# Patient Record
Sex: FEMALE | HISPANIC OR LATINO | Employment: OTHER | URBAN - METROPOLITAN AREA
[De-identification: names, ages, dates, MRNs, and addresses within clinical notes are randomized per-mention and may not be internally consistent; named-entity substitution may affect disease eponyms.]

---

## 2017-01-04 ENCOUNTER — APPOINTMENT (OUTPATIENT)
Dept: RADIOLOGY | Facility: MEDICAL CENTER | Age: 77
End: 2017-01-04
Attending: EMERGENCY MEDICINE
Payer: MEDICARE

## 2017-01-04 ENCOUNTER — HOSPITAL ENCOUNTER (EMERGENCY)
Facility: MEDICAL CENTER | Age: 77
End: 2017-01-04
Attending: EMERGENCY MEDICINE
Payer: MEDICARE

## 2017-01-04 VITALS
SYSTOLIC BLOOD PRESSURE: 120 MMHG | BODY MASS INDEX: 30.02 KG/M2 | RESPIRATION RATE: 16 BRPM | HEIGHT: 62 IN | HEART RATE: 76 BPM | DIASTOLIC BLOOD PRESSURE: 62 MMHG | WEIGHT: 163.14 LBS | TEMPERATURE: 98.6 F | OXYGEN SATURATION: 92 %

## 2017-01-04 DIAGNOSIS — J40 BRONCHITIS: ICD-10-CM

## 2017-01-04 LAB
ALBUMIN SERPL BCP-MCNC: 3.2 G/DL (ref 3.2–4.9)
ALBUMIN/GLOB SERPL: 0.9 G/DL
ALP SERPL-CCNC: 93 U/L (ref 30–99)
ALT SERPL-CCNC: 18 U/L (ref 2–50)
ANION GAP SERPL CALC-SCNC: 9 MMOL/L (ref 0–11.9)
AST SERPL-CCNC: 21 U/L (ref 12–45)
BASOPHILS # BLD AUTO: 0.4 % (ref 0–1.8)
BASOPHILS # BLD: 0.05 K/UL (ref 0–0.12)
BILIRUB SERPL-MCNC: 0.6 MG/DL (ref 0.1–1.5)
BUN SERPL-MCNC: 11 MG/DL (ref 8–22)
CALCIUM SERPL-MCNC: 10.1 MG/DL (ref 8.4–10.2)
CHLORIDE SERPL-SCNC: 98 MMOL/L (ref 96–112)
CO2 SERPL-SCNC: 26 MMOL/L (ref 20–33)
CREAT SERPL-MCNC: 0.93 MG/DL (ref 0.5–1.4)
EOSINOPHIL # BLD AUTO: 0.25 K/UL (ref 0–0.51)
EOSINOPHIL NFR BLD: 2 % (ref 0–6.9)
ERYTHROCYTE [DISTWIDTH] IN BLOOD BY AUTOMATED COUNT: 42.5 FL (ref 35.9–50)
FLUAV+FLUBV AG SPEC QL IA: NORMAL
GFR SERPL CREATININE-BSD FRML MDRD: 59 ML/MIN/1.73 M 2
GLOBULIN SER CALC-MCNC: 3.6 G/DL (ref 1.9–3.5)
GLUCOSE SERPL-MCNC: 129 MG/DL (ref 65–99)
HCT VFR BLD AUTO: 39.6 % (ref 37–47)
HGB BLD-MCNC: 13.2 G/DL (ref 12–16)
IMM GRANULOCYTES # BLD AUTO: 0.04 K/UL (ref 0–0.11)
IMM GRANULOCYTES NFR BLD AUTO: 0.3 % (ref 0–0.9)
LYMPHOCYTES # BLD AUTO: 3.86 K/UL (ref 1–4.8)
LYMPHOCYTES NFR BLD: 31.3 % (ref 22–41)
MCH RBC QN AUTO: 29.8 PG (ref 27–33)
MCHC RBC AUTO-ENTMCNC: 33.3 G/DL (ref 33.6–35)
MCV RBC AUTO: 89.4 FL (ref 81.4–97.8)
MONOCYTES # BLD AUTO: 1.19 K/UL (ref 0–0.85)
MONOCYTES NFR BLD AUTO: 9.6 % (ref 0–13.4)
NEUTROPHILS # BLD AUTO: 6.95 K/UL (ref 2–7.15)
NEUTROPHILS NFR BLD: 56.4 % (ref 44–72)
NRBC # BLD AUTO: 0 K/UL
NRBC BLD AUTO-RTO: 0 /100 WBC
PLATELET # BLD AUTO: 305 K/UL (ref 164–446)
PMV BLD AUTO: 9.8 FL (ref 9–12.9)
POTASSIUM SERPL-SCNC: 4.2 MMOL/L (ref 3.6–5.5)
PROT SERPL-MCNC: 6.8 G/DL (ref 6–8.2)
RBC # BLD AUTO: 4.43 M/UL (ref 4.2–5.4)
SIGNIFICANT IND 70042: NORMAL
SITE SITE: NORMAL
SODIUM SERPL-SCNC: 133 MMOL/L (ref 135–145)
SOURCE SOURCE: NORMAL
WBC # BLD AUTO: 12.3 K/UL (ref 4.8–10.8)

## 2017-01-04 PROCEDURE — 71010 DX-CHEST-PORTABLE (1 VIEW): CPT

## 2017-01-04 PROCEDURE — 94640 AIRWAY INHALATION TREATMENT: CPT

## 2017-01-04 PROCEDURE — 99284 EMERGENCY DEPT VISIT MOD MDM: CPT

## 2017-01-04 PROCEDURE — A9270 NON-COVERED ITEM OR SERVICE: HCPCS | Performed by: EMERGENCY MEDICINE

## 2017-01-04 PROCEDURE — 80053 COMPREHEN METABOLIC PANEL: CPT

## 2017-01-04 PROCEDURE — 87503 INFLUENZA DNA AMP PROB ADDL: CPT

## 2017-01-04 PROCEDURE — 87040 BLOOD CULTURE FOR BACTERIA: CPT

## 2017-01-04 PROCEDURE — 85025 COMPLETE CBC W/AUTO DIFF WBC: CPT

## 2017-01-04 PROCEDURE — 87400 INFLUENZA A/B EACH AG IA: CPT

## 2017-01-04 PROCEDURE — 700102 HCHG RX REV CODE 250 W/ 637 OVERRIDE(OP): Performed by: EMERGENCY MEDICINE

## 2017-01-04 PROCEDURE — 87502 INFLUENZA DNA AMP PROBE: CPT

## 2017-01-04 PROCEDURE — 36415 COLL VENOUS BLD VENIPUNCTURE: CPT

## 2017-01-04 PROCEDURE — 700101 HCHG RX REV CODE 250: Performed by: EMERGENCY MEDICINE

## 2017-01-04 RX ORDER — HYDROCODONE BITARTRATE AND ACETAMINOPHEN 5; 325 MG/1; MG/1
1 TABLET ORAL ONCE
Status: COMPLETED | OUTPATIENT
Start: 2017-01-04 | End: 2017-01-04

## 2017-01-04 RX ORDER — HYDROCODONE BITARTRATE AND ACETAMINOPHEN 5; 325 MG/1; MG/1
1 TABLET ORAL EVERY 6 HOURS PRN
Qty: 15 TAB | Refills: 0 | Status: SHIPPED | OUTPATIENT
Start: 2017-01-04 | End: 2017-03-06

## 2017-01-04 RX ORDER — ALBUTEROL SULFATE 90 UG/1
2 AEROSOL, METERED RESPIRATORY (INHALATION)
Qty: 1 INHALER | Refills: 2 | Status: SHIPPED | OUTPATIENT
Start: 2017-01-04 | End: 2017-03-06

## 2017-01-04 RX ORDER — BENZONATATE 200 MG/1
200 CAPSULE ORAL 3 TIMES DAILY PRN
Qty: 30 CAP | Refills: 1 | Status: SHIPPED | OUTPATIENT
Start: 2017-01-04 | End: 2017-03-06

## 2017-01-04 RX ORDER — AZITHROMYCIN 250 MG/1
TABLET, FILM COATED ORAL
Qty: 6 TAB | Refills: 0 | Status: SHIPPED | OUTPATIENT
Start: 2017-01-04 | End: 2017-03-06

## 2017-01-04 RX ADMIN — ALBUTEROL SULFATE 2.5 MG: 2.5 SOLUTION RESPIRATORY (INHALATION) at 21:49

## 2017-01-04 RX ADMIN — IPRATROPIUM BROMIDE 0.5 MG: 0.5 SOLUTION RESPIRATORY (INHALATION) at 21:49

## 2017-01-04 RX ADMIN — HYDROCODONE BITARTRATE AND ACETAMINOPHEN 1 TABLET: 5; 325 TABLET ORAL at 21:47

## 2017-01-04 ASSESSMENT — PAIN SCALES - WONG BAKER: WONGBAKER_NUMERICALRESPONSE: HURTS JUST A LITTLE BIT

## 2017-01-04 NOTE — ED AVS SNAPSHOT
IncellDx Access Code: [email or web address]  Expires: 2/3/2017 10:52 PM    Your email address is not on file at [facility].  Email Addresses are required for you to sign up for IncellDx, please contact [phone] to verify your personal information and to provide your email address prior to attempting to register for IncellDx.    Pamela Monaco  [address] [place] [address]    IncellDx  A secure, online tool to manage your health information     [facility]’s IncellDx® is a secure, online tool that connects you to your personalized health information from the privacy of your home -- day or night - making it very easy for you to manage your healthcare. Once the activation process is completed, you can even access your medical information using the IncellDx nishi, which is available for free in the Apple Nishi store or Google Play store.     To learn more about IncellDx, visit [email or web address]    There are two levels of access available (as shown below):   My Chart Features  [facility] Primary Care Doctor [facility]  Specialists [facility]  Urgent  Care Non-[facility] Primary Care Doctor   Email your healthcare team securely and privately 24/7 X X X    Manage appointments: schedule your next appointment; view details of past/upcoming appointments X      Request prescription refills. X      View recent personal medical records, including lab and immunizations X X X X   View health record, including health history, allergies, medications X X X X   Read reports about your outpatient visits, procedures, consult and ER notes X X X X   See your discharge summary, which is a recap of your hospital and/or ER visit that includes your diagnosis, lab results, and care plan X X  X     How to register for SinDelantalt:  Once your e-mail address has been verified, follow the following steps to sign up for SinDelantalt.     1. Go to  [email or web address]  2. Click on the Sign Up Now box, which takes you to the New Member Sign Up page. You  will need to provide the following information:  a. Enter your PHARMAJET Access Code exactly as it appears at the top of this page. (You will not need to use this code after you’ve completed the sign-up process. If you do not sign up before the expiration date, you must request a new code.)   b. Enter your date of birth.   c. Enter your home email address.   d. Click Submit, and follow the next screen’s instructions.  3. Create a Inspire Healtht ID. This will be your PHARMAJET login ID and cannot be changed, so think of one that is secure and easy to remember.  4. Create a PHARMAJET password. You can change your password at any time.  5. Enter your Password Reset Question and Answer. This can be used at a later time if you forget your password.   6. Enter your e-mail address. This allows you to receive e-mail notifications when new information is available in PHARMAJET.  7. Click Sign Up. You can now view your health information.    For assistance activating your PHARMAJET account, call [phone]

## 2017-01-04 NOTE — ED AVS SNAPSHOT
1/4/2017          Pamela Monaco  [address] [facility] [address]    Dear Pamela:    [facility] wants to ensure your discharge home is safe and you or your loved ones have had all your questions answered regarding your care after you leave the hospital.    You may receive a telephone call within two days of your discharge.  This call is to make certain you understand your discharge instructions as well as ensure we provided you with the best care possible during your stay with us.     The call will only last approximately 3-5 minutes and will be done by a nurse.    Once again, we want to ensure your discharge home is safe and that you have a clear understanding of any next steps in your care.  If you have any questions or concerns, please do not hesitate to contact us, we are here for you.  Thank you for choosing [facility] for your healthcare needs.    Sincerely,    King Brody    [facility]

## 2017-01-04 NOTE — ED AVS SNAPSHOT
After Visit Summary                                                                                                                Pamela Monaco   MRN: [id number]    Department:  [facility], Emergency Dept   Date of Visit:  1/4/2017            [facility], Emergency Dept    [address]    [place] [address]    Phone:  [phone]      You were seen by     Paulo Carbajal M.D.      Your Diagnosis Was     Bronchitis     J40       These are the medications you received during your hospitalization from 01/04/2017 1811 to 01/04/2017 2252     Date/Time Order Dose Route Action    01/04/2017 2149 albuterol (PROVENTIL) 2.5mg/0.5ml nebulizer solution 2.5 mg 2.5 mg Inhalation Given    01/04/2017 2149 ipratropium (ATROVENT) 0.02 % nebulizer solution 0.5 mg 0.5 mg Nebulization Given    01/04/2017 2147 hydrocodone-acetaminophen (NORCO) 5-325 MG per tablet 1 Tab 1 Tab Oral Given      Follow-up Information     1. Follow up with Jj Ramirez M.D..    Specialty:  Family Medicine    Contact information    [facility]  [address]  [place] [id number] [phone]        Medication Information     Review all of your home medications and newly ordered medications with your primary doctor and/or pharmacist as soon as possible. Follow medication instructions as directed by your doctor and/or pharmacist.     Please keep your complete medication list with you and share with your physician. Update the information when medications are discontinued, doses are changed, or new medications (including over-the-counter products) are added; and carry medication information at all times in the event of emergency situations.               Medication List      START taking these medications        Instructions    albuterol 108 (90 BASE) MCG/ACT Aers inhalation aerosol    Inhale 2 Puffs by mouth every 2 hours as needed for Shortness of Breath (or cough).   Dose:  2 Puff       azithromycin  250 MG Tabs   Commonly known as:  ZITHROMAX    2 po now then 1 po q day for 4 days       benzonatate 200 MG capsule   Commonly known as:  TESSALON    Take 1 Cap by mouth 3 times a day as needed for Cough.   Dose:  200 mg       hydrocodone-acetaminophen 5-325 MG Tabs per tablet   Commonly known as:  NORCO    Take 1 Tab by mouth every 6 hours as needed (headache or cough).   Dose:  1 Tab         ASK your doctor about these medications        Instructions    enalapril 10 MG Tabs   Commonly known as:  VASOTEC    Take 10 mg by mouth every day.   Dose:  10 mg               Procedures and tests performed during your visit     Procedure/Test Number of Times Performed    BLOOD CULTURE 2    CBC WITH DIFFERENTIAL 1    COMP METABOLIC PANEL 1    DX-CHEST-PORTABLE (1 VIEW) 1    ESTIMATED GFR 1    INFLUENZA BY PCR, A/B/H1N1 1    INFLUENZA RAPID 1    IV SALINE LOCK 1        Discharge Instructions       Bronquitis  (Bronchitis)  Return to the ER if any worsening shortness of breath. Contact your doctor for follow-up  La bronquitis es gatito inflamación (el modo que tiene el organismo de reaccionar a gatito lesión o infección) de los bronquios Los bronquios son los conductos que se extienden desde la tráquea hasta los pulmones. Si la inflamación se agrava, puede causar la falta de aire.  CAUSAS  Las causas de la inflamación pueden ser:  · Un virus  · Gérmenes (bacteria).  · Polvo  · Alergenos  · La polución y muchos otros irritantes  Las células que revisten el árbol bronquial están cubiertas con pequeños pelos (cilias). Esta constantemente producen un movimiento desde los pulmones hacia la boca. De yony modo se mantienen los pulmones libres de polución. Cuando estas células se irritan y no pueden cumplir serrato función, comienza a formarse la mucosidad. [place] produce la característica tos de la bronquitis. La tos es el mecanismo por el cual se limpian los pulmones cuando las cilias no pueden cumplir serrato función. Sin alguno de estos mecanismos  protectores, el material se acumularía en los pulmones Entonces se desarrollaría gatito pulmonía.   El fumar es gatito de las causas más frecuentes de bronquitis y puede contribuir a la neumonía. Abandonar yony hábito es lo más importante que puede hacer para beneficiarse.  TRATAMIENTO  · El médico le prescribirá antibióticos si la causa es gatito bacteria, y medicamentos para abrir las vías aéreas y facilitar la respiración. También puede recomendar o prescribir un expectorante. El expectorante aflojará la mucosidad para que pueda eliminarla. Sólo tome medicamentos de venta jozef o prescriptos para calmar el dolor, las molestias, o bajar la fiebre según las indicaciones de serrato médico.  · Eliminar todo lo que causa el problema (por ejemplo el hábito de fumar) es fundamental para evitar que empeore.  · Un antitusígeno puede prescribirse para aliviar los síntomas de la tos.  · Podrán indicarle inhalantes para aliviar los síntomas actuales y ayudar a prevenir problemas futuros.  · Aquellos que sufren bronquitis crónica (recurrente) puede ser necesaria la administración de corticoides.  SOLICITE ATENCIÓN MÉDICA INMEDIATAMENTE SI:  · Jalyn el tratamiento observa que elimina esputo similar a pus (purulento).  · Tiene fiebre.  · Serrato bebé tiene más de 3 meses y serrato temperatura rectal es de 102° F (38.9° C) o más.  · Serrato bebé tiene 3 meses o menos y serrato temperatura rectal es de 100.4° F (38° C) o más.  · Se siente cada vez más enfermo.  · Tiene cada vez más dificultad para respirar, tiene ruidos al respirar o le falta el aire.  Es necesario buscar atención médica inmediata si es gatito persona de edad avanzada o sufre alguna otra enfermedad.  ASEGURESE DE QUE:   · Comprende estas instrucciones.  · Controlará serrato enfermedad.  · Solicitará ayuda inmediatamente si no mejora o si empeora.  Document Released: 12/18/2006 Document Revised: 03/11/2013  Sawerly® Patient Information ©2014 [employer].            Patient Information     Patient  Information    Following emergency treatment: all patient requiring follow-up care must return either to a private physician or a clinic if your condition worsens before you are able to obtain further medical attention, please return to the emergency room.     Billing Information    At [facility], we work to make the billing process streamlined for our patients.  Our Representatives are here to answer any questions you may have regarding your hospital bill.  If you have insurance coverage and have supplied your insurance information to us, we will submit a claim to your insurer on your behalf.  Should you have any questions regarding your bill, we can be reached online or by phone as follows:  Online: You are able pay your bills online or live chat with our representatives about any billing questions you may have. We are here to help Monday - Friday from 8:00am to 7:30pm and 9:00am - 12:00pm on Saturdays.  Please visit [email or web address]  for more information.   Phone:  [phone] or [phone]    Please note that your emergency physician, surgeon, pathologist, radiologist, anesthesiologist, and other specialists are not employed by [facility] and will therefore bill separately for their services.  Please contact them directly for any questions concerning their bills at the numbers below:     Emergency Physician Services:  [phone]  [facility]:  [phone]  [facility]:  [phone]  [facility]:  [phone]    1. Your final bill may vary from the amount quoted upon discharge if all procedures are not complete at that time, or if your doctor has additional procedures of which we are not aware. You will receive an additional bill if you return to the Emergency Department at [facility] for suture removal regardless of the facility of which the sutures were placed.     2. Please arrange for settlement of this account  at the emergency registration.    3. All self-pay accounts are due in full at the time of treatment.  If you are unable to meet this obligation then payment is expected within 4-5 days.     4. If you have had radiology studies (CT, X-ray, Ultrasound, MRI), you have received a preliminary result during your emergency department visit. Please contact the radiology department [phone] to receive a copy of your final result. Please discuss the Final result with your primary physician or with the follow up physician provided.     Crisis Hotline:  [place] Crisis Hotline:  7-109-ZTGELHZ or 1-753.212.7790  [place] Crisis Hotline:    1-691.217.5874 or 892-636-1085         ED Discharge Follow Up Questions    1. In order to provide you with very good care, we would like to follow up with a phone call in the next few days.  May we have your permission to contact you?     YES /  NO    2. What is the best phone number to call you? (       )_____-__________    3. What is the best time to call you?      Morning  /  Afternoon  /  Evening                   Patient Signature:  ____________________________________________________________    Date:  ____________________________________________________________

## 2017-01-05 ENCOUNTER — PATIENT OUTREACH (OUTPATIENT)
Dept: HEALTH INFORMATION MANAGEMENT | Facility: OTHER | Age: 77
End: 2017-01-05

## 2017-01-05 LAB
FLUAV H1 2009 PAND RNA SPEC QL NAA+PROBE: NOT DETECTED
FLUAV RNA SPEC QL NAA+PROBE: NEGATIVE
FLUBV RNA SPEC QL NAA+PROBE: NEGATIVE

## 2017-01-05 NOTE — FLOWSHEET NOTE
01/04/17 2149   Events/Summary/Plan   Events/Summary/Plan SVN in ED    Interdisciplinary Plan of Care-Goals (Indications)   Obstructive Ventilatory Defect or Pulmonary Disease without Obvious Obstruction Strong Subjective / Objective Improvement   Interdisciplinary Plan of Care-Outcomes    Bronchodilator Outcome Patient at Stable Baseline   Education   Education Yes - Pt. / Family has been Instructed in use of Respiratory Equipment;Yes - Pt. / Family has been Instructed in use of Respiratory Medications and Adverse Reactions   RT Assessment of Delivered Medications   Evaluation of Medication Delivery Daily Yes-- Pt /Family has been Instructed in use of Respiratory Medications and Adverse Reactions   SVN Group   #SVN Performed Yes   Given By: Mask   Date SVN Last Changed 01/04/17   Date SVN Next Change Due (Q 7 Days) 01/11/17   Respiratory WDL   Respiratory (WDL) X   Chest Exam   Work Of Breathing / Effort Mild   Respiration 20   Pulse 75   Breath Sounds   Pre/Post Intervention Pre Intervention Assessment  (post tx clear and dimminished on ascultation )   RUL Breath Sounds Diminished   RML Breath Sounds Diminished   RLL Breath Sounds Diminished   FAN Breath Sounds Diminished   LLL Breath Sounds Diminished   Oximetry   Continuous Oximetry Yes   Oxygen   Home O2 Use Prior To Admission? No   Pulse Oximetry 96 %   O2 (LPM) 0   O2 (FiO2) 21   O2 Daily Delivery Respiratory  Room Air with O2 Available

## 2017-01-05 NOTE — ED NOTES
Discharge instructions provided.  Rx x4 given and educated on how and when to take medications, Pt verbalized the understanding of discharge instructions to follow up with PCP and to return to ER if condition worsens.  Pt ambulated out of ER without difficulty.

## 2017-01-05 NOTE — PROGRESS NOTES
01/05/2017 1124 - Discharge Outreach attempt - Home number is out of service. Cell number is for Pamela Jackson - message left to have patient call me back.   01/10/2017 0830 - Received callback from daughter who states she is in [place] and patient is in [place]. Phone number for patient given. Patient called via .

## 2017-01-05 NOTE — DISCHARGE INSTRUCTIONS
Bronquitis  (Bronchitis)  Return to the ER if any worsening shortness of breath. Contact your doctor for follow-up  La bronquitis es gatito inflamación (el modo que tiene el organismo de reaccionar a gatito lesión o infección) de los bronquios Los bronquios son los conductos que se extienden desde la tráquea hasta los pulmones. Si la inflamación se agrava, puede causar la falta de aire.  CAUSAS  Las causas de la inflamación pueden ser:  · Un virus  · Gérmenes (bacteria).  · Polvo  · Alergenos  · La polución y muchos otros irritantes  Las células que revisten el árbol bronquial están cubiertas con pequeños pelos (cilias). Esta constantemente producen un movimiento desde los pulmones hacia la boca. De yony modo se mantienen los pulmones libres de polución. Cuando estas células se irritan y no pueden cumplir serrato función, comienza a formarse la mucosidad. [place] produce la característica tos de la bronquitis. La tos es el mecanismo por el cual se limpian los pulmones cuando las cilias no pueden cumplir serrato función. Sin alguno de estos mecanismos protectores, el material se acumularía en los pulmones Entonces se desarrollaría gatito pulmonía.   El fumar es gatito de las causas más frecuentes de bronquitis y puede contribuir a la neumonía. Abandonar yony hábito es lo más importante que puede hacer para beneficiarse.  TRATAMIENTO  · El médico le prescribirá antibióticos si la causa es gatito bacteria, y medicamentos para abrir las vías aéreas y facilitar la respiración. También puede recomendar o prescribir un expectorante. El expectorante aflojará la mucosidad para que pueda eliminarla. Sólo tome medicamentos de venta jozef o prescriptos para calmar el dolor, las molestias, o bajar la fiebre según las indicaciones de serrato médico.  · Eliminar todo lo que causa el problema (por ejemplo el hábito de fumar) es fundamental para evitar que empeore.  · Un antitusígeno puede prescribirse para aliviar los síntomas de la tos.  · Podrán indicarle  inhalantes para aliviar los síntomas actuales y ayudar a prevenir problemas futuros.  · Aquellos que sufren bronquitis crónica (recurrente) puede ser necesaria la administración de corticoides.  SOLICITE ATENCIÓN MÉDICA INMEDIATAMENTE SI:  · Jalyn el tratamiento observa que elimina esputo similar a pus (purulento).  · Tiene fiebre.  · Serrato bebé tiene más de 3 meses y serrato temperatura rectal es de 102° F (38.9° C) o más.  · Serrato bebé tiene 3 meses o menos y serrato temperatura rectal es de 100.4° F (38° C) o más.  · Se siente cada vez más enfermo.  · Tiene cada vez más dificultad para respirar, tiene ruidos al respirar o le falta el aire.  Es necesario buscar atención médica inmediata si es gatito persona de edad avanzada o sufre alguna otra enfermedad.  ASEGURESE DE QUE:   · Comprende estas instrucciones.  · Controlará serrato enfermedad.  · Solicitará ayuda inmediatamente si no mejora o si empeora.  Document Released: 12/18/2006 Document Revised: 03/11/2013  [employer]® Patient Information ©2014 [employer].

## 2017-01-05 NOTE — ED PROVIDER NOTES
"ED Provider Note    CHIEF COMPLAINT  Chief Complaint   Patient presents with   • Cough       HPI  Pamela Monaco is a 76 y.o. female who presents with cough a single off 10 days productive of phlegm of unknown color. She feels short of breath now. The cough is worsening she brought in for evaluation. She does have a headache with coughing bitemporal. Nausea no vomiting no chest pain no abdominal pain no arm neck or back pain it's knew she has had fever and chills off and on no leg pain or swelling is new all other systems negative    REVIEW OF SYSTEMS  See HPI for further details    PAST MEDICAL HISTORY  Past Medical History   Diagnosis Date   • Hypertension    • Diabetes (HCC)        FAMILY HISTORY  History reviewed. No pertinent family history.    SOCIAL HISTORY  Social History     Social History   • Marital Status:      Spouse Name: N/A   • Number of Children: N/A   • Years of Education: N/A     Social History Main Topics   • Smoking status: Never Smoker    • Smokeless tobacco: None   • Alcohol Use: No   • Drug Use: No   • Sexual Activity: Not Asked     Other Topics Concern   • None     Social History Narrative       SURGICAL HISTORY  History reviewed. No pertinent past surgical history.    CURRENT MEDICATIONS  Home Medications     Reviewed by Phillip Arciniega R.N. (Registered Nurse) on 01/04/17 at 1830  Med List Status: Complete    Medication Last Dose Status    enalapril (VASOTEC) 10 MG TABS 1/3/2017 Active                ALLERGIES  Allergies   Allergen Reactions   • Penicillins        PHYSICAL EXAM  VITAL SIGNS: /79 mmHg  Pulse 85  Temp(Src) 37 °C (98.6 °F)  Resp 18  Ht 1.575 m (5' 2.01\")  Wt 74 kg (163 lb 2.3 oz)  BMI 29.83 kg/m2  SpO2 92%    Constitutional: Patient is alert and oriented x3 in mild distress   HENT: Moist mucous membranes  Eyes: No conjunctivitis or icterus  Neck: Trachea is midline  Lymphatic: Negative anterior cervical adenopathy  Cardiovascular: Normal heart rate "   Thorax & Lungs: Clear to auscultation wheezing with coughing  Abdomen: Nontender to palpation  Neurologic: Normal motor sensation  Extremities: No edema  Psychiatric: Affect normal, Judgment normal, Mood normal.     Results for orders placed or performed during the hospital encounter of 01/04/17   CBC WITH DIFFERENTIAL   Result Value Ref Range    WBC 12.3 (H) 4.8 - 10.8 K/uL    RBC 4.43 4.20 - 5.40 M/uL    Hemoglobin 13.2 12.0 - 16.0 g/dL    Hematocrit 39.6 37.0 - 47.0 %    MCV 89.4 81.4 - 97.8 fL    MCH 29.8 27.0 - 33.0 pg    MCHC 33.3 (L) 33.6 - 35.0 g/dL    RDW 42.5 35.9 - 50.0 fL    Platelet Count 305 164 - 446 K/uL    MPV 9.8 9.0 - 12.9 fL    Neutrophils-Polys 56.40 44.00 - 72.00 %    Lymphocytes 31.30 22.00 - 41.00 %    Monocytes 9.60 0.00 - 13.40 %    Eosinophils 2.00 0.00 - 6.90 %    Basophils 0.40 0.00 - 1.80 %    Immature Granulocytes 0.30 0.00 - 0.90 %    Nucleated RBC 0.00 /100 WBC    Neutrophils (Absolute) 6.95 2.00 - 7.15 K/uL    Lymphs (Absolute) 3.86 1.00 - 4.80 K/uL    Monos (Absolute) 1.19 (H) 0.00 - 0.85 K/uL    Eos (Absolute) 0.25 0.00 - 0.51 K/uL    Baso (Absolute) 0.05 0.00 - 0.12 K/uL    Immature Granulocytes (abs) 0.04 0.00 - 0.11 K/uL    NRBC (Absolute) 0.00 K/uL   COMP METABOLIC PANEL   Result Value Ref Range    Sodium 133 (L) 135 - 145 mmol/L    Potassium 4.2 3.6 - 5.5 mmol/L    Chloride 98 96 - 112 mmol/L    Co2 26 20 - 33 mmol/L    Anion Gap 9.0 0.0 - 11.9    Glucose 129 (H) 65 - 99 mg/dL    Bun 11 8 - 22 mg/dL    Creatinine 0.93 0.50 - 1.40 mg/dL    Calcium 10.1 8.4 - 10.2 mg/dL    AST(SGOT) 21 12 - 45 U/L    ALT(SGPT) 18 2 - 50 U/L    Alkaline Phosphatase 93 30 - 99 U/L    Total Bilirubin 0.6 0.1 - 1.5 mg/dL    Albumin 3.2 3.2 - 4.9 g/dL    Total Protein 6.8 6.0 - 8.2 g/dL    Globulin 3.6 (H) 1.9 - 3.5 g/dL    A-G Ratio 0.9 g/dL   INFLUENZA RAPID   Result Value Ref Range    Significant Indicator NEG     Source RESP     Site RESPIRATORY     Rapid Influenza A-B       Negative for  Influenza A and Influenza B antigens.  Infection due to influenza A or B cannot be ruled out  since the antigen present in the specimen may be below the  detection limit of the test. Culture confirmation of  negative samples is recommended.     ESTIMATED GFR   Result Value Ref Range    GFR If African American >60 >60 mL/min/1.73 m 2    GFR If Non African American 59 (A) >60 mL/min/1.73 m 2        DX-CHEST-PORTABLE (1 VIEW)   Final Result      No acute cardiopulmonary abnormality.              COURSE & MEDICAL DECISION MAKING  Pertinent Labs & Imaging studies reviewed. (See chart for details)  Patient was given albuterol treatment Norco feels much improved. She alternates between entirely normal to slightly low oxygenation. She has no respiratory distress.    Workup shows no sign of pneumonia negative influenza testing and she has tight wheezing with coughing only. I think the patient has bronchitis and will treat her with azithromycin for possible atypical pneumonia as well as albuterol Tessalon for cough and hydrocodone for cough and headache. She is given return precautions and follow-up    FINAL IMPRESSION  1.   1. Bronchitis        2.   3.         Electronically signed by: Paulo Carbajal, 1/4/2017 9:19 PM

## 2017-01-09 LAB
BACTERIA BLD CULT: NORMAL
SIGNIFICANT IND 70042: NORMAL
SITE SITE: NORMAL
SOURCE SOURCE: NORMAL

## 2017-01-10 LAB
BACTERIA BLD CULT: NORMAL
SIGNIFICANT IND 70042: NORMAL
SITE SITE: NORMAL
SOURCE SOURCE: NORMAL

## 2017-03-06 DIAGNOSIS — Z01.812 PRE-OPERATIVE LABORATORY EXAMINATION: ICD-10-CM

## 2017-03-06 DIAGNOSIS — Z01.810 PRE-OPERATIVE CARDIOVASCULAR EXAMINATION: ICD-10-CM

## 2017-03-06 LAB
ANION GAP SERPL CALC-SCNC: 7 MMOL/L (ref 0–11.9)
APPEARANCE UR: CLEAR
BACTERIA #/AREA URNS HPF: ABNORMAL /HPF
BILIRUB UR QL STRIP.AUTO: NEGATIVE
BUN SERPL-MCNC: 17 MG/DL (ref 8–22)
CALCIUM SERPL-MCNC: 9.8 MG/DL (ref 8.4–10.2)
CHLORIDE SERPL-SCNC: 103 MMOL/L (ref 96–112)
CO2 SERPL-SCNC: 26 MMOL/L (ref 20–33)
COLOR UR: YELLOW
CREAT SERPL-MCNC: 0.85 MG/DL (ref 0.5–1.4)
CULTURE IF INDICATED INDCX: NO UA CULTURE
EKG IMPRESSION: NORMAL
EPI CELLS #/AREA URNS HPF: ABNORMAL /HPF
ERYTHROCYTE [DISTWIDTH] IN BLOOD BY AUTOMATED COUNT: 44.2 FL (ref 35.9–50)
GFR SERPL CREATININE-BSD FRML MDRD: >60 ML/MIN/1.73 M 2
GLUCOSE SERPL-MCNC: 86 MG/DL (ref 65–99)
GLUCOSE UR STRIP.AUTO-MCNC: NEGATIVE MG/DL
HCT VFR BLD AUTO: 39.5 % (ref 37–47)
HGB BLD-MCNC: 13 G/DL (ref 12–16)
KETONES UR STRIP.AUTO-MCNC: NEGATIVE MG/DL
LEUKOCYTE ESTERASE UR QL STRIP.AUTO: NEGATIVE
MCH RBC QN AUTO: 30.1 PG (ref 27–33)
MCHC RBC AUTO-ENTMCNC: 32.9 G/DL (ref 33.6–35)
MCV RBC AUTO: 91.4 FL (ref 81.4–97.8)
MICRO URNS: ABNORMAL
MUCOUS THREADS #/AREA URNS HPF: ABNORMAL /HPF
NITRITE UR QL STRIP.AUTO: NEGATIVE
PH UR STRIP.AUTO: 6 [PH]
PLATELET # BLD AUTO: 361 K/UL (ref 164–446)
PMV BLD AUTO: 9.2 FL (ref 9–12.9)
POTASSIUM SERPL-SCNC: 4.3 MMOL/L (ref 3.6–5.5)
PROT UR QL STRIP: NEGATIVE MG/DL
RBC # BLD AUTO: 4.32 M/UL (ref 4.2–5.4)
RBC # URNS HPF: ABNORMAL /HPF
RBC UR QL AUTO: ABNORMAL
SODIUM SERPL-SCNC: 136 MMOL/L (ref 135–145)
SP GR UR STRIP.AUTO: 1.01
WBC # BLD AUTO: 9.1 K/UL (ref 4.8–10.8)
WBC #/AREA URNS HPF: ABNORMAL /HPF

## 2017-03-06 PROCEDURE — 85027 COMPLETE CBC AUTOMATED: CPT

## 2017-03-06 PROCEDURE — 81001 URINALYSIS AUTO W/SCOPE: CPT

## 2017-03-06 PROCEDURE — 87641 MR-STAPH DNA AMP PROBE: CPT

## 2017-03-06 PROCEDURE — 36415 COLL VENOUS BLD VENIPUNCTURE: CPT

## 2017-03-06 PROCEDURE — 80048 BASIC METABOLIC PNL TOTAL CA: CPT

## 2017-03-06 PROCEDURE — 87640 STAPH A DNA AMP PROBE: CPT | Mod: 59

## 2017-03-06 RX ORDER — IBUPROFEN 800 MG/1
800 TABLET ORAL EVERY 6 HOURS PRN
COMMUNITY
End: 2021-12-09 | Stop reason: CLARIF

## 2017-03-06 NOTE — OR NURSING
PreAdmit Appointment: Patient instructed to continue regularly prescribed medications through day before surgery. Instructed to take the following medications the day of surgery with a sip of water per Anesthesia protocol: none.

## 2017-03-07 LAB
SCCMEC + MECA PNL NOSE NAA+PROBE: NEGATIVE
SCCMEC + MECA PNL NOSE NAA+PROBE: NEGATIVE

## 2017-03-13 ENCOUNTER — APPOINTMENT (OUTPATIENT)
Dept: RADIOLOGY | Facility: MEDICAL CENTER | Age: 77
DRG: 470 | End: 2017-03-13
Attending: ORTHOPAEDIC SURGERY
Payer: MEDICARE

## 2017-03-13 ENCOUNTER — HOSPITAL ENCOUNTER (INPATIENT)
Facility: MEDICAL CENTER | Age: 77
LOS: 1 days | DRG: 470 | End: 2017-03-14
Attending: ORTHOPAEDIC SURGERY | Admitting: ORTHOPAEDIC SURGERY
Payer: MEDICARE

## 2017-03-13 PROBLEM — M17.11 PRIMARY OSTEOARTHRITIS OF RIGHT KNEE: Status: ACTIVE | Noted: 2017-03-13

## 2017-03-13 LAB
GLUCOSE BLD-MCNC: 139 MG/DL (ref 65–99)
GLUCOSE BLD-MCNC: 177 MG/DL (ref 65–99)

## 2017-03-13 PROCEDURE — 502579 HCHG PACK, TOTAL KNEE: Performed by: ORTHOPAEDIC SURGERY

## 2017-03-13 PROCEDURE — 700112 HCHG RX REV CODE 229: Performed by: ORTHOPAEDIC SURGERY

## 2017-03-13 PROCEDURE — 160009 HCHG ANES TIME/MIN: Performed by: ORTHOPAEDIC SURGERY

## 2017-03-13 PROCEDURE — A4606 OXYGEN PROBE USED W OXIMETER: HCPCS | Performed by: ORTHOPAEDIC SURGERY

## 2017-03-13 PROCEDURE — 501480 HCHG STOCKINETTE: Performed by: ORTHOPAEDIC SURGERY

## 2017-03-13 PROCEDURE — 501487 HCHG STRYKER TIP: Performed by: ORTHOPAEDIC SURGERY

## 2017-03-13 PROCEDURE — 700102 HCHG RX REV CODE 250 W/ 637 OVERRIDE(OP): Performed by: ORTHOPAEDIC SURGERY

## 2017-03-13 PROCEDURE — 700105 HCHG RX REV CODE 258: Performed by: ORTHOPAEDIC SURGERY

## 2017-03-13 PROCEDURE — 700111 HCHG RX REV CODE 636 W/ 250 OVERRIDE (IP): Performed by: ORTHOPAEDIC SURGERY

## 2017-03-13 PROCEDURE — 500864 HCHG NEEDLE, SPINAL 18G: Performed by: ORTHOPAEDIC SURGERY

## 2017-03-13 PROCEDURE — 700111 HCHG RX REV CODE 636 W/ 250 OVERRIDE (IP)

## 2017-03-13 PROCEDURE — 110371 HCHG SHELL REV 272: Performed by: ORTHOPAEDIC SURGERY

## 2017-03-13 PROCEDURE — 160029 HCHG SURGERY MINUTES - 1ST 30 MINS LEVEL 4: Performed by: ORTHOPAEDIC SURGERY

## 2017-03-13 PROCEDURE — 82962 GLUCOSE BLOOD TEST: CPT

## 2017-03-13 PROCEDURE — 160022 HCHG BLOCK: Performed by: ORTHOPAEDIC SURGERY

## 2017-03-13 PROCEDURE — A6223 GAUZE >16<=48 NO W/SAL W/O B: HCPCS | Performed by: ORTHOPAEDIC SURGERY

## 2017-03-13 PROCEDURE — A9270 NON-COVERED ITEM OR SERVICE: HCPCS | Performed by: ORTHOPAEDIC SURGERY

## 2017-03-13 PROCEDURE — 110382 HCHG SHELL REV 271: Performed by: ORTHOPAEDIC SURGERY

## 2017-03-13 PROCEDURE — 160048 HCHG OR STATISTICAL LEVEL 1-5: Performed by: ORTHOPAEDIC SURGERY

## 2017-03-13 PROCEDURE — 700101 HCHG RX REV CODE 250

## 2017-03-13 PROCEDURE — 160035 HCHG PACU - 1ST 60 MINS PHASE I: Performed by: ORTHOPAEDIC SURGERY

## 2017-03-13 PROCEDURE — 94760 N-INVAS EAR/PLS OXIMETRY 1: CPT

## 2017-03-13 PROCEDURE — 501486 HCHG STRYKER IRRIG SET HC W/TUBING: Performed by: ORTHOPAEDIC SURGERY

## 2017-03-13 PROCEDURE — 500367 HCHG DRAIN KIT, HEMOVAC: Performed by: ORTHOPAEDIC SURGERY

## 2017-03-13 PROCEDURE — 770001 HCHG ROOM/CARE - MED/SURG/GYN PRIV*

## 2017-03-13 PROCEDURE — 500094 HCHG BONE CEMENT MIXER (MIX-E-VAC II): Performed by: ORTHOPAEDIC SURGERY

## 2017-03-13 PROCEDURE — 73560 X-RAY EXAM OF KNEE 1 OR 2: CPT | Mod: RT

## 2017-03-13 PROCEDURE — 500811 HCHG LENS/HOOD FOR SPACESUIT: Performed by: ORTHOPAEDIC SURGERY

## 2017-03-13 PROCEDURE — 160036 HCHG PACU - EA ADDL 30 MINS PHASE I: Performed by: ORTHOPAEDIC SURGERY

## 2017-03-13 PROCEDURE — 501838 HCHG SUTURE GENERAL: Performed by: ORTHOPAEDIC SURGERY

## 2017-03-13 PROCEDURE — 502000 HCHG MISC OR IMPLANTS RC 0278: Performed by: ORTHOPAEDIC SURGERY

## 2017-03-13 PROCEDURE — 0SRC0J9 REPLACEMENT OF RIGHT KNEE JOINT WITH SYNTHETIC SUBSTITUTE, CEMENTED, OPEN APPROACH: ICD-10-PCS | Performed by: ORTHOPAEDIC SURGERY

## 2017-03-13 PROCEDURE — 160041 HCHG SURGERY MINUTES - EA ADDL 1 MIN LEVEL 4: Performed by: ORTHOPAEDIC SURGERY

## 2017-03-13 PROCEDURE — 160002 HCHG RECOVERY MINUTES (STAT): Performed by: ORTHOPAEDIC SURGERY

## 2017-03-13 DEVICE — CEMENT ORTHOPEDIC HV US  (10/PK): Type: IMPLANTABLE DEVICE | Status: FUNCTIONAL

## 2017-03-13 DEVICE — IMPLANTABLE DEVICE: Type: IMPLANTABLE DEVICE | Status: FUNCTIONAL

## 2017-03-13 DEVICE — IMPLANT GII C/R ART INS SZ 3-4 11MM: Type: IMPLANTABLE DEVICE | Status: FUNCTIONAL

## 2017-03-13 DEVICE — IMPLANT GNS II C/R FEM SIZE 5 RIGHT: Type: IMPLANTABLE DEVICE | Status: FUNCTIONAL

## 2017-03-13 DEVICE — IMPLANT GII CM TIB SIZE 3 RIGHT (1EA): Type: IMPLANTABLE DEVICE | Status: FUNCTIONAL

## 2017-03-13 RX ORDER — BUPIVACAINE HYDROCHLORIDE 5 MG/ML
INJECTION, SOLUTION EPIDURAL; INTRACAUDAL
Status: DISCONTINUED | OUTPATIENT
Start: 2017-03-13 | End: 2017-03-13 | Stop reason: HOSPADM

## 2017-03-13 RX ORDER — DIPHENHYDRAMINE HYDROCHLORIDE 50 MG/ML
25 INJECTION INTRAMUSCULAR; INTRAVENOUS EVERY 6 HOURS PRN
Status: DISCONTINUED | OUTPATIENT
Start: 2017-03-13 | End: 2017-03-14 | Stop reason: HOSPADM

## 2017-03-13 RX ORDER — CELECOXIB 200 MG/1
200 CAPSULE ORAL
Status: DISCONTINUED | OUTPATIENT
Start: 2017-03-14 | End: 2017-03-14 | Stop reason: HOSPADM

## 2017-03-13 RX ORDER — DOCUSATE SODIUM 100 MG/1
100 CAPSULE, LIQUID FILLED ORAL 2 TIMES DAILY
Status: DISCONTINUED | OUTPATIENT
Start: 2017-03-13 | End: 2017-03-14 | Stop reason: HOSPADM

## 2017-03-13 RX ORDER — ONDANSETRON 2 MG/ML
4 INJECTION INTRAMUSCULAR; INTRAVENOUS EVERY 4 HOURS PRN
Status: DISCONTINUED | OUTPATIENT
Start: 2017-03-13 | End: 2017-03-14 | Stop reason: HOSPADM

## 2017-03-13 RX ORDER — DIPHENHYDRAMINE HCL 25 MG
25 TABLET ORAL NIGHTLY PRN
Status: DISCONTINUED | OUTPATIENT
Start: 2017-03-14 | End: 2017-03-14 | Stop reason: HOSPADM

## 2017-03-13 RX ORDER — ENEMA 19; 7 G/133ML; G/133ML
1 ENEMA RECTAL
Status: DISCONTINUED | OUTPATIENT
Start: 2017-03-13 | End: 2017-03-14 | Stop reason: HOSPADM

## 2017-03-13 RX ORDER — DIPHENHYDRAMINE HCL 25 MG
25 TABLET ORAL EVERY 6 HOURS PRN
Status: DISCONTINUED | OUTPATIENT
Start: 2017-03-13 | End: 2017-03-14 | Stop reason: HOSPADM

## 2017-03-13 RX ORDER — SODIUM CHLORIDE 9 MG/ML
1000 INJECTION, SOLUTION INTRAVENOUS CONTINUOUS
Status: DISCONTINUED | OUTPATIENT
Start: 2017-03-13 | End: 2017-03-14 | Stop reason: HOSPADM

## 2017-03-13 RX ORDER — SODIUM CHLORIDE 9 MG/ML
INJECTION, SOLUTION INTRAVENOUS CONTINUOUS
Status: DISCONTINUED | OUTPATIENT
Start: 2017-03-13 | End: 2017-03-13

## 2017-03-13 RX ORDER — HALOPERIDOL 5 MG/ML
1 INJECTION INTRAMUSCULAR EVERY 6 HOURS PRN
Status: DISCONTINUED | OUTPATIENT
Start: 2017-03-13 | End: 2017-03-14 | Stop reason: HOSPADM

## 2017-03-13 RX ORDER — ACETAMINOPHEN 325 MG/1
650 TABLET ORAL EVERY 6 HOURS
Status: DISCONTINUED | OUTPATIENT
Start: 2017-03-13 | End: 2017-03-14 | Stop reason: HOSPADM

## 2017-03-13 RX ORDER — OXYCODONE HYDROCHLORIDE 10 MG/1
10 TABLET ORAL
Status: DISCONTINUED | OUTPATIENT
Start: 2017-03-13 | End: 2017-03-14 | Stop reason: HOSPADM

## 2017-03-13 RX ORDER — KETOROLAC TROMETHAMINE 30 MG/ML
15 INJECTION, SOLUTION INTRAMUSCULAR; INTRAVENOUS EVERY 6 HOURS
Status: COMPLETED | OUTPATIENT
Start: 2017-03-13 | End: 2017-03-14

## 2017-03-13 RX ORDER — ENALAPRIL MALEATE 5 MG/1
20 TABLET ORAL DAILY
Status: DISCONTINUED | OUTPATIENT
Start: 2017-03-13 | End: 2017-03-14 | Stop reason: HOSPADM

## 2017-03-13 RX ORDER — DEXAMETHASONE SODIUM PHOSPHATE 4 MG/ML
4 INJECTION, SOLUTION INTRA-ARTICULAR; INTRALESIONAL; INTRAMUSCULAR; INTRAVENOUS; SOFT TISSUE
Status: DISCONTINUED | OUTPATIENT
Start: 2017-03-13 | End: 2017-03-14 | Stop reason: HOSPADM

## 2017-03-13 RX ORDER — OXYCODONE HYDROCHLORIDE 5 MG/1
5 TABLET ORAL
Status: DISCONTINUED | OUTPATIENT
Start: 2017-03-13 | End: 2017-03-14 | Stop reason: HOSPADM

## 2017-03-13 RX ORDER — BISACODYL 10 MG
10 SUPPOSITORY, RECTAL RECTAL
Status: DISCONTINUED | OUTPATIENT
Start: 2017-03-13 | End: 2017-03-14 | Stop reason: HOSPADM

## 2017-03-13 RX ORDER — AMOXICILLIN 250 MG
1 CAPSULE ORAL NIGHTLY
Status: DISCONTINUED | OUTPATIENT
Start: 2017-03-13 | End: 2017-03-14 | Stop reason: HOSPADM

## 2017-03-13 RX ORDER — SODIUM CHLORIDE, SODIUM LACTATE, POTASSIUM CHLORIDE, CALCIUM CHLORIDE 600; 310; 30; 20 MG/100ML; MG/100ML; MG/100ML; MG/100ML
1000 INJECTION, SOLUTION INTRAVENOUS
Status: COMPLETED | OUTPATIENT
Start: 2017-03-13 | End: 2017-03-13

## 2017-03-13 RX ORDER — AMOXICILLIN 250 MG
1 CAPSULE ORAL
Status: DISCONTINUED | OUTPATIENT
Start: 2017-03-13 | End: 2017-03-14 | Stop reason: HOSPADM

## 2017-03-13 RX ORDER — POLYETHYLENE GLYCOL 3350 17 G/17G
1 POWDER, FOR SOLUTION ORAL 2 TIMES DAILY PRN
Status: DISCONTINUED | OUTPATIENT
Start: 2017-03-13 | End: 2017-03-14 | Stop reason: HOSPADM

## 2017-03-13 RX ADMIN — SODIUM CHLORIDE 2 G: 9 INJECTION, SOLUTION INTRAVENOUS at 17:41

## 2017-03-13 RX ADMIN — ENALAPRIL MALEATE 20 MG: 5 TABLET ORAL at 14:05

## 2017-03-13 RX ADMIN — DOCUSATE SODIUM 100 MG: 100 CAPSULE, LIQUID FILLED ORAL at 14:05

## 2017-03-13 RX ADMIN — SENNOSIDES AND DOCUSATE SODIUM 1 TABLET: 8.6; 5 TABLET ORAL at 21:03

## 2017-03-13 RX ADMIN — ACETAMINOPHEN 650 MG: 325 TABLET, FILM COATED ORAL at 14:05

## 2017-03-13 RX ADMIN — METFORMIN HYDROCHLORIDE 500 MG: 500 TABLET, FILM COATED ORAL at 17:41

## 2017-03-13 RX ADMIN — SODIUM CHLORIDE, SODIUM LACTATE, POTASSIUM CHLORIDE, CALCIUM CHLORIDE 1000 ML: 600; 310; 30; 20 INJECTION, SOLUTION INTRAVENOUS at 08:00

## 2017-03-13 RX ADMIN — ACETAMINOPHEN 650 MG: 325 TABLET, FILM COATED ORAL at 21:03

## 2017-03-13 RX ADMIN — DOCUSATE SODIUM 100 MG: 100 CAPSULE, LIQUID FILLED ORAL at 21:03

## 2017-03-13 RX ADMIN — SODIUM CHLORIDE 1000 ML: 9 INJECTION, SOLUTION INTRAVENOUS at 13:56

## 2017-03-13 RX ADMIN — KETOROLAC TROMETHAMINE 15 MG: 30 INJECTION, SOLUTION INTRAMUSCULAR; INTRAVENOUS at 14:04

## 2017-03-13 RX ADMIN — KETOROLAC TROMETHAMINE 15 MG: 30 INJECTION, SOLUTION INTRAMUSCULAR; INTRAVENOUS at 21:03

## 2017-03-13 RX ADMIN — ASPIRIN 325 MG: 325 TABLET, DELAYED RELEASE ORAL at 23:33

## 2017-03-13 RX ADMIN — INSULIN LISPRO 3 UNITS: 100 INJECTION, SOLUTION INTRAVENOUS; SUBCUTANEOUS at 21:12

## 2017-03-13 ASSESSMENT — PAIN SCALES - GENERAL
PAINLEVEL_OUTOF10: 0
PAINLEVEL_OUTOF10: 4
PAINLEVEL_OUTOF10: 3
PAINLEVEL_OUTOF10: ASSUMED PAIN PRESENT

## 2017-03-13 ASSESSMENT — LIFESTYLE VARIABLES
EVER_SMOKED: NEVER
EVER_SMOKED: NEVER
ALCOHOL_USE: NO

## 2017-03-13 ASSESSMENT — PAIN SCALES - WONG BAKER: WONGBAKER_NUMERICALRESPONSE: HURTS A LITTLE MORE

## 2017-03-13 NOTE — IP AVS SNAPSHOT
" Home Care Instructions                                                                                                                  Name:Pamela Monaco  Medical Record Number:[id number]  CSN: [id number]    YOB: 1940   Age: 77 y.o.  Sex: female  HT:1.6 m (5' 3\") WT: 72 kg (158 lb 11.7 oz)          Admit Date: 3/13/2017     Discharge Date:   Today's Date: 3/14/2017  Attending Doctor:  Pop Cartwright M.D.                  Allergies:  Penicillins            Discharge Instructions       Discharge Instructions    Discharged to home by car with relative. Discharged via wheelchair, hospital escort: Yes.  Special equipment needed: Walker    Be sure to schedule a follow-up appointment with your primary care doctor or any specialists as instructed.     Discharge Plan:   Influenza Vaccine Indication: Not indicated: Previously immunized this influenza season and > 8 years of age    I understand that a diet low in cholesterol, fat, and sodium is recommended for good health. Unless I have been given specific instructions below for another diet, I accept this instruction as my diet prescription.   Other diet: Diabetic    Special Instructions: Discharge instructions for the Orthopedic Patient    Follow up with Primary Care Physician within 2 weeks of discharge to home, regarding:  Review of medications and diagnostic testing.  Surveillance for medical complications.  Workup and treatment of osteoporosis, if appropriate.     -Is this a Joint Replacement patient? Yes Total Joint Knee Replacement Discharge Instructions    Pain  - The goal is to slowly wean off the prescription pain medicine.  - Ice can be used for pain control.  20 minutes at a time is recommended, and never directly against your skin or incision.  - Most patients are off the pain pills by 3 weeks; others may require a low level of pain medications for many months.  If your pain continues to be severe, follow up with your physician.  Infection    "   Knee joint infections; occur in fewer than 2% of patients. The most common causes of infection following total knee replacement surgery are from bacteria that enter the bloodstream during dental procedures, urinary tract infections, or skin infections. These bacteria can lodge around your knee replacement and cause an infection.  - Keep the incision as clean and dry as possible.  - Always wash your hands before touching your incision.  - Skin infections tend to develop around 7-10 days after surgery; most can be treated with oral antibiotics.  - Dental Care should be delayed for 3 months after surgery, your surgeon recommends taking a dose of antibiotics 1 hour prior to any dental procedure. After 2 years, most surgeons recommend antibiotics only before an extensive procedure.  Ask your surgeon what he recommends.  - Signs and symptoms of infection can include:  low grade fever, redness, pain, swelling and drainage from your incision.  Notify your surgeon immediately if you develop any of these symptoms.  Other instructions  - Bowel habits - constipation is extremely common and is caused by a combination of anesthesia, lack of mobility and pain medicine.  Use stool softeners or laxatives if necessary. It is important not to ignore this problem, as bowel obstructions can be a serious complication after joint replacement surgery.  - Mood/Energy Level - Many patients experience a lack of energy and endurance for up to 2-3 months after surgery.  Some may also feel down and can even become depressed.  This is likely due to the postoperative anemia, change in activity level, lack of sleep, pain medicine and just the emotional reaction to the surgery itself that is a big disruption in a person’s life.  This usually passes.  If symptoms persist, follow up with your primary physician.  - Returning to work - Your surgeon will give you more specific instructions. Depending on the type of activities you perform, it may be 6  to 8 weeks before you return to work.   Generally, if you work a sedentary job requiring little standing or walking, most patients may return within 2-6 weeks.  Manual labor jobs involving walking, lifting and standing may take longer. Your surgeon’s office can provide a release to part-time or light duty work early on in your recovery and progress you to full duty as able.    - Driving - If your left knee was replaced and you have an automatic transmission, you may be able to begin driving in a week or so, provided you are no longer taking narcotic pain medication. If your right knee was replaced, avoid driving for 6 to 8 weeks. Remember that your reflexes may not be as sharp as before your surgery. Ask your surgeon for specific instructions.   - Avoiding falls - A fall during the first few weeks after surgery can damage your new knee and may result in a need for further surgery.   throw rugs and tack down loose carpeting.  Be aware of floor hazards such as pets, small objects or uneven surfaces.    - [place] Metal Detectors - The sensitivity of metal detectors varies and it is likely that your prosthesis will cause an alarm.  Inform the  of your artificial joint.  Diet  - Resume your normal diet as tolerated.  - It is important to achieve a healthy nutritional status by eating a well balanced diet on a regular basis.  - Your physician may recommend that you take iron and vitamin supplements.   - Continue to drink plenty of fluids.  Shower/Bathing  - You may shower as soon as you get home from the hospital unless otherwise instructed.  - Keep your incision out of water.  To keep the incision dry when showering, cover it with a plastic bag or plastic wrap.  - Pat incision dry if it gets wet.  Don’t rub.  - Do not submerge in a bath until staples are out and the incision is completely healed. (Approximately 6-8 weeks)  Dressing Change:  Procedure (if recommended by your physician)  - Wash  hands.  - Open all new dressing change materials.  - Remove old dressing and discard.  - Inspect incision for redness, increase in clear drainage, yellow/green drainage, odor and surrounding skin hot to touch.  -  ABD (large gauze) pad or “island dressing” by one corner and lay over the incision.  Be careful not to touch the inside of the dressing that will lay over the incision.  - Secure in place as instructed (Ace wrap or tape).    Swelling/Bruising    - Swelling can last from 3-6 months.  - Elevate your leg higher than your heart while reclining.   The first week you are home you should elevate your leg an equal amount of time, as you are active.    - Anti-inflammatory pills can be taken once you have stopped the blood thinners.  - The swelling is usually worse after you go home since you are upright for longer periods of time.  - Bruising is common and can involve the entire leg including the thigh, calf and even foot. Bruising often does not appear until after you arrive home and it can be quite dramatic- purple, black, and green.  The bruising you can see is not usually concerning and will subside without any treatment.      Blood Clot Prevention  Blood clots in the legs and the less common, but frightening, clots that travel to the lungs are a real focus of our preventative. Most patients are at standard risk for them, but those patients who are at higher risk include people who have had previous clots, a family history of clotting, smoking, diabetes, obesity, advanced age, use of estrogen and a sedentary lifestyle.    - Signs of blood clots in legs - Swelling in thigh, calf or ankle that does not go down with elevation.  Pain, heat and tenderness in calf, back of calf or groin area.  NOTE: blood clots can occur in either leg.  - You have been receiving anticoagulant therapy (blood thinners) in the hospital and you may be instructed to continue at home depending on your risk factors.  - Your risk for  developing a clot continues for up to 2-3 months after surgery.  You should avoid prolonged sitting and dehydration during that time (long air trips and car trips).  If you do take a trip during this time, please get up and move around every 1- 1.5 hours.  - If you are prescribed blood-thinning medication for home, follow instructions as directed. (Handouts provided if applicable).      Activity  Once home, you should continue to stay active. The key is to remember not to overdo it! While you can expect some good days and some bad days, you should notice a gradual improvement and a gradual increase in your endurance over the next 6 to 12 months. Exercise is a critical component of home care, particularly during the first few weeks after surgery.     - Normal activities of daily living You should be able to resume most within 3 to 6 weeks following surgery. Some pain with activity and at night is common for several weeks after surgery  -  Physical Therapy Exercises - Continue to do the exercises prescribed for at least two months after surgery. Riding a stationary bicycle can help maintain muscle tone and keep your knee flexible. Try to achieve the maximum degree of bending and extension possible. (handout provided by Therapist).  - Sexual Activity -. Your surgeon can tell you when it safe to resume sexual activity.    - Sleeping Positions - You can safely sleep on your back, on either side, or on your stomach.   - Other Activities - Walk as much as you like, but remember that walking is no substitute for the exercises your doctor and physical therapist will prescribe. Lower impact activities are preferred.  If you have specific questions, consult your Surgeon.    When to Call the Doctor   Call the physician if:   - Fever over 100.5? F  - Increased pain, drainage, redness, odor or heat around the incision area  - Shaking chills  - Increased knee pain with activity and rest  - Increased pain in calf, tenderness or  redness above or below the knee  - Increased swelling of calf, ankle, foot  - Sudden increased shortness of breath, sudden onset of chest pain, localized chest pain with coughing  - Incision opening  Or, if there are any questions or concerns about medications or care.       -Is this patient being discharged with medication to prevent blood clots?  Yes, Aspirin Aspirin, ASA oral tablets  What is this medicine?  ASPIRIN (AS pir in) is a pain reliever. It is used to treat mild pain and fever. This medicine is also used as directed by a doctor to prevent and to treat heart attacks, to prevent strokes, and to treat arthritis or inflammation.  This medicine may be used for other purposes; ask your health care provider or pharmacist if you have questions.  COMMON BRAND NAME(S): Aspir-Low, Aspir-Vita , Aspirtab , GeekStatus Advanced Aspirin, GeekStatus Aspirin Extra Strength, GeekStatus Aspirin Plus, GeekStatus Aspirin, GeekStatus Genuine Aspirin, GeekStatus Womens Aspirin , Bufferin Extra Strength, Bufferin Low Dose, Bufferin  What should I tell my health care provider before I take this medicine?  They need to know if you have any of these conditions:  -anemia  -asthma  -bleeding problems  -child with chickenpox, the flu, or other viral infection  -diabetes  -gout  -if you frequently drink alcohol containing drinks  -kidney disease  -liver disease  -low level of vitamin K  -lupus  -smoke tobacco  -stomach ulcers or other problems  -an unusual or allergic reaction to aspirin, tartrazine dye, other medicines, dyes, or preservatives  -pregnant or trying to get pregnant  -breast-feeding  How should I use this medicine?  Take this medicine by mouth with a glass of water. Follow the directions on the package or prescription label. You can take this medicine with or without food. If it upsets your stomach, take it with food. Do not take your medicine more often than directed.  Talk to your pediatrician regarding the use of this medicine in children. While  this drug may be prescribed for children as young as 12 years of age for selected conditions, precautions do apply. Children and teenagers should not use this medicine to treat chicken pox or flu symptoms unless directed by a doctor.  Patients over 65 years old may have a stronger reaction and need a smaller dose.  Overdosage: If you think you have taken too much of this medicine contact a poison control center or emergency room at once.  NOTE: This medicine is only for you. Do not share this medicine with others.  What if I miss a dose?  If you are taking this medicine on a regular schedule and miss a dose, take it as soon as you can. If it is almost time for your next dose, take only that dose. Do not take double or extra doses.  What may interact with this medicine?  Do not take this medicine with any of the following medications:  -cidofovir  -ketorolac  -probenecid  This medicine may also interact with the following medications:  -alcohol  -alendronate  -bismuth subsalicylate  -flavocoxid  -herbal supplements like feverfew, garlic, moises, ginkgo biloba, horse chestnut  -medicines for diabetes or glaucoma like acetazolamide, methazolamide  -medicines for gout  -medicines that treat or prevent blood clots like enoxaparin, heparin, ticlopidine, warfarin  -other aspirin and aspirin-like medicines  -NSAIDs, medicines for pain and inflammation, like ibuprofen or naproxen  -pemetrexed  -sulfinpyrazone  -varicella live vaccine  This list may not describe all possible interactions. Give your health care provider a list of all the medicines, herbs, non-prescription drugs, or dietary supplements you use. Also tell them if you smoke, drink alcohol, or use illegal drugs. Some items may interact with your medicine.  What should I watch for while using this medicine?  If you are treating yourself for pain, tell your doctor or health care professional if the pain lasts more than 10 days, if it gets worse, or if there is a new  or different kind of pain. Tell your doctor if you see redness or swelling. Also, check with your doctor if you have a fever that lasts for more than 3 days. Only take this medicine to prevent heart attacks or blood clotting if prescribed by your doctor or health care professional.  Do not take aspirin or aspirin-like medicines with this medicine. Too much aspirin can be dangerous. Always read the labels carefully.  This medicine can irritate your stomach or cause bleeding problems. Do not smoke cigarettes or drink alcohol while taking this medicine. Do not lie down for 30 minutes after taking this medicine to prevent irritation to your throat.  If you are scheduled for any medical or dental procedure, tell your healthcare provider that you are taking this medicine. You may need to stop taking this medicine before the procedure.  What side effects may I notice from receiving this medicine?  Side effects that you should report to your doctor or health care professional as soon as possible:  -allergic reactions like skin rash, itching or hives, swelling of the face, lips, or tongue  -black, tarry stools  -bloody, coffee ground-like vomit  -breathing problems  -changes in hearing, ringing in the ears  -confusion  -general ill feeling or flu-like symptoms  -pain on swallowing  -redness, blistering, peeling or loosening of the skin, including inside the mouth or nose  -trouble passing urine or change in the amount of urine  -unusual bleeding or bruising  -unusually weak or tired  -yellowing of the eyes or skin  Side effects that usually do not require medical attention (report to your doctor or health care professional if they continue or are bothersome):  -diarrhea or constipation  -nausea, vomiting  -stomach gas, heartburn  This list may not describe all possible side effects. Call your doctor for medical advice about side effects. You may report side effects to FDA at 9-392-FDA-4196.  Where should I keep my  medicine?  Keep out of the reach of children.  Store at room temperature between 15 and 30 degrees C (59 and 86 degrees F). Protect from heat and moisture. Do not use this medicine if it has a strong vinegar smell. Throw away any unused medicine after the expiration date.  NOTE: This sheet is a summary. It may not cover all possible information. If you have questions about this medicine, talk to your doctor, pharmacist, or health care provider.  © 2014, [employer]/Gold Standard. (3/10/2009 10:44:17 AM)      · Is patient discharged on Warfarin / Coumadin?   No     · Is patient Post Blood Transfusion?  No    Depression / Suicide Risk    As you are discharged from this [facility] facility, it is important to learn how to keep safe from harming yourself.    Recognize the warning signs:  · Abrupt changes in personality, positive or negative- including increase in energy   · Giving away possessions  · Change in eating patterns- significant weight changes-  positive or negative  · Change in sleeping patterns- unable to sleep or sleeping all the time   · Unwillingness or inability to communicate  · Depression  · Unusual sadness, discouragement and loneliness  · Talk of wanting to die  · Neglect of personal appearance   · Rebelliousness- reckless behavior  · Withdrawal from people/activities they love  · Confusion- inability to concentrate     If you or a loved one observes any of these behaviors or has concerns about self-harm, here's what you can do:  · Talk about it- your feelings and reasons for harming yourself  · Remove any means that you might use to hurt yourself (examples: pills, rope, extension cords, firearm)  · Get professional help from the community (Mental Health, Substance Abuse, psychological counseling)  · Do not be alone:Call your Safe Contact- someone whom you trust who will be there for you.  · Call your local CRISIS HOTLINE 737-8002 or 387-679-0857  · Call your local Children's Mobile Crisis Response  Team [facility] (107) 225-5864 or www.[employer])  · Call the toll free National Suicide Prevention Hotlines   · National Suicide Prevention Lifeline 252-967-RBXX (9273)  · National Hope Line Network 800-SUICIDE (504-3662)        Follow-up Information     1. Follow up with Pop Cartwright M.D.. Schedule an appointment as soon as possible for a visit in 2 weeks.    Specialty:  Orthopaedics    Contact information    [address]  [address]  [place] [address] [phone]           Discharge Medication Instructions:    Below are the medications your physician expects you to take upon discharge:    Review all your home medications and newly ordered medications with your doctor and/or pharmacist. Follow medication instructions as directed by your doctor and/or pharmacist.    Please keep your medication list with you and share with your physician.               Medication List      CONTINUE taking these medications        Instructions    aspirin EC 81 MG Tbec   Last time this was given:  325 mg on 3/13/2017 11:33 PM   Commonly known as:  ECOTRIN    Take 81 mg by mouth every day.   Dose:  81 mg       enalapril 10 MG Tabs   Last time this was given:  20 mg on 3/13/2017  2:05 PM   Commonly known as:  VASOTEC    Take 20 mg by mouth every day.   Dose:  20 mg       ibuprofen 800 MG Tabs   Commonly known as:  MOTRIN    Take 800 mg by mouth every 6 hours as needed.   Dose:  800 mg       metformin 500 MG Tabs   Last time this was given:  500 mg on 3/14/2017  8:10 AM   Commonly known as:  GLUCOPHAGE    Take 500 mg by mouth every day.   Dose:  500 mg               Instructions           Diet / Nutrition:    Follow any diet instructions given to you by your doctor or the dietician, including how much salt (sodium) you are allowed each day.    If you are overweight, talk to your doctor about a weight reduction plan.    Activity:    Remain physically active following your doctor's instructions about exercise and  activity.    Rest often.     Any time you become even a little tired or short of breath, SIT DOWN and rest.    Worsening Symptoms:    Report any of the following signs and symptoms to the doctor's office immediately:    *Pain of jaw, arm, or neck  *Chest pain not relieved by medication                               *Dizziness or loss of consciousness  *Difficulty breathing even when at rest   *More tired than usual                                       *Bleeding drainage or swelling of surgical site  *Swelling of feet, ankles, legs or stomach                 *Fever (>100ºF)  *Pink or blood tinged sputum  *Weight gain (3lbs/day or 5lbs /week)           *Shock from internal defibrillator (if applicable)  *Palpitations or irregular heartbeats                *Cool and/or numb extremities    Stroke Awareness    Common Risk Factors for Stroke include:    Age  Atrial Fibrillation  Carotid Artery Stenosis  Diabetes Mellitus  Excessive alcohol consumption  High blood pressure  Overweight   Physical inactivity  Smoking    Warning signs and symptoms of a stroke include:    *Sudden numbness or weakness of the face, arm or leg (especially on one side of the body).  *Sudden confusion, trouble speaking or understanding.  *Sudden trouble seeing in one or both eyes.  *Sudden trouble walking, dizziness, loss of balance or coordination.Sudden severe headache with no known cause.    It is very important to get treatment quickly when a stroke occurs. If you experience any of the above warning signs, call 911 immediately.                   Disclaimer         Quit Smoking / Tobacco Use:    I understand the use of any tobacco products increases my chance of suffering from future heart disease or stroke and could cause other illnesses which may shorten my life. Quitting the use of tobacco products is the single most important thing I can do to improve my health. For further information on smoking / tobacco cessation call a Toll Free Quit  Line at 1-219.707.9344 (*National Cancer [place]) or 1-527.329.3620 (American Lung Association) or you can access the web based program at www.lungusa.org.    [place] Tobacco Users Help Line:  (176) 515-2753       Toll Free: 1-830.425.6512  Quit Tobacco Program [facility] (749)197-0252    Crisis Hotline:    [place] Crisis Hotline:  4-138-RUFQEBM or 1-746.443.8293    [place] Crisis Hotline:    1-215.312.8905 or 793-475-1054    Discharge Survey:   Thank you for choosing [facility]. We hope we did everything we could to make your hospital stay a pleasant one. You may be receiving a phone survey and we would appreciate your time and participation in answering the questions. Your input is very valuable to us in our efforts to improve our service to our patients and their families.        My signature on this form indicates that:    1. I have reviewed and understand the above information.  2. My questions regarding this information have been answered to my satisfaction.  3. I have formulated a plan with my discharge nurse to obtain my prescribed medications for home.                  Disclaimer         __________________________________                     __________       ________                       Patient Signature                                                 Date                    Time

## 2017-03-13 NOTE — IP AVS SNAPSHOT
Innohub Access Code: [email or web address]  Expires: 4/13/2017  9:03 AM    Your email address is not on file at [facility]  Email Addresses are required for you to sign up for Innohub, please contact [phone] to verify your personal information and to provide your email address prior to attempting to register for Innohub.    Pamela Monaco  [address] [place] [address]    Innohub  A secure, online tool to manage your health information     [facility]s Innohub® is a secure, online tool that connects you to your personalized health information from the privacy of your home -- day or night - making it very easy for you to manage your healthcare. Once the activation process is completed, you can even access your medical information using the Innohub nishi, which is available for free in the Apple Nishi store or Google Play store.     To learn more about Innohub, visit [email or web address]    There are two levels of access available (as shown below):   My Chart Features  [facility] Primary Care Doctor [facility]  Specialists [facility]  Urgent  Care Non-[facility] Primary Care Doctor   Email your healthcare team securely and privately 24/7 X X X    Manage appointments: schedule your next appointment; view details of past/upcoming appointments X      Request prescription refills. X      View recent personal medical records, including lab and immunizations X X X X   View health record, including health history, allergies, medications X X X X   Read reports about your outpatient visits, procedures, consult and ER notes X X X X   See your discharge summary, which is a recap of your hospital and/or ER visit that includes your diagnosis, lab results, and care plan X X  X     How to register for Circassiat:  Once your e-mail address has been verified, follow the following steps to sign up for Circassiat.     1. Go to  [email or web address]  2. Click on the Sign Up Now box, which takes you to the New Member Sign Up  page. You will need to provide the following information:  a. Enter your MedAvail Access Code exactly as it appears at the top of this page. (You will not need to use this code after you’ve completed the sign-up process. If you do not sign up before the expiration date, you must request a new code.)   b. Enter your date of birth.   c. Enter your home email address.   d. Click Submit, and follow the next screen’s instructions.  3. Create a MedAvail ID. This will be your MedAvail login ID and cannot be changed, so think of one that is secure and easy to remember.  4. Create a MedAvail password. You can change your password at any time.  5. Enter your Password Reset Question and Answer. This can be used at a later time if you forget your password.   6. Enter your e-mail address. This allows you to receive e-mail notifications when new information is available in MedAvail.  7. Click Sign Up. You can now view your health information.    For assistance activating your MedAvail account, call [phone]

## 2017-03-13 NOTE — IP AVS SNAPSHOT
3/14/2017          Pamela Monaco  [address] [place] [address]    Dear Pamela:    [facility] wants to ensure your discharge home is safe and you or your loved ones have had all your questions answered regarding your care after you leave the hospital.    You may receive a telephone call within two days of your discharge.  This call is to make certain you understand your discharge instructions as well as ensure we provided you with the best care possible during your stay with us.     The call will only last approximately 3-5 minutes and will be done by a nurse.    Once again, we want to ensure your discharge home is safe and that you have a clear understanding of any next steps in your care.  If you have any questions or concerns, please do not hesitate to contact us, we are here for you.  Thank you for choosing [facility] for your healthcare needs.    Sincerely,    King Brody    [facility]

## 2017-03-13 NOTE — OP REPORT
DATE OF SERVICE:  03/13/2017    PREOPERATIVE DIAGNOSIS:  Right knee degenerative joint disease.    POSTOPERATIVE DIAGNOSIS:  Right knee degenerative joint disease.    PROCEDURE PERFORMED:  Right total knee arthroplasty.    SURGEON:  Pop Cartwright MD    ASSISTANT:  Phillip Butts CST/TERRIE    ANESTHESIA:  General and regional block.    COMPLICATIONS:  None.    BLOOD LOSS:  100 mL.    INDICATION:  The patient is a 77-year-old woman with a history of severe right   knee DJD with pain refractory to conservative management.  She is indicated   for total knee arthroplasty.    IMPLANTS:  Apple and Nephew Barbara femur size 5 cemented, tibia size 3   cemented, tibial insert 11 mm, patella 32 mm onset cemented 7.5 mm thickness.    DESCRIPTION OF PROCEDURE:  Following induction of general anesthesia, time-out   was performed confirming patient, site and procedure.  Two grams of Ancef and   a gram of tranexamic acid IV were ordered and administered.  The Shultz   catheter was placed.  The right thigh tourniquet was applied.  The right lower   extremity was prepped and draped in sterile fashion.  The extremity was   exsanguinated and the tourniquet was inflated for a total of 68 minutes at 275   mmHg.  A standard anterior midline longitudinal incision was made and   dissection was carried down sharply through the skin.  A standard medial   parapatellar arthrotomy was performed and a medial release was performed.  The   posterior half of the fat pad was excised.  The lateral patellofemoral   ligaments were incised.  The patella was everted and the knee was flexed.  The   ACL was resected.  The PCL was retained.  The medial and lateral menisci were   resected.  The drill was used to enter the tibial and femoral canals.    Femoral IM benedicto with 5-degree valgus module and sizer were inserted.  Proper   rotation was checked and the sizer was pinned.  Sizing was done and a   preliminary anterior cut was made.  Next, the distal  cutting block was placed   at the neutral position and the distal cut was made.  The distal femoral   cutting block was then pinned and the femoral cuts were completed.  Posterior   osteophytes were removed.  Next, the tibial IM benedicto with the tibial cutting   block was inserted in the proper rotation and pinned into place in the neutral   position off the lateral plateau.  With proper retraction, the proximal   tibial cut was made.  Flexion and extension gaps were checked and found to be   appropriate and well balanced.  The tibia was sized and punched for the tibial   trial.  The tibial trial was inserted followed by the femoral trial.    Trialing with the 9 and 11 mm insert was done, which provided for good   alignment and good motion with good ligamentous balance.  The patella was then   prepared free hand using a caliper for measurement.  It was sized and drilled   for the lugs and patellar trialing was done and the patella tracked well with   no thumbs test.  The femoral lugs were then punched and the tibial keel was   punched.  Trials were removed.    The knee was thoroughly irrigated.  Bony surfaces were dried.  Cementing was   done.  Cement in the doughy phase was pressurized onto the proximal tibia and   the tibial component was impacted into place.  Excess cement was removed.    Next, the femoral component was likewise cemented and impacted into place.    Excess cement was removed.  The 11 mm insert was placed and the knee was held   in extension.  The patellar component was then likewise cemented and held with   patellar clamp.  Excess cement was removed and the cement was allowed to   cure.  Trialing was then done and the 11 mm insert was chosen.  The knee was   thoroughly irrigated.  The tourniquet was deflated and hemostasis was achieved   with electrocautery.  The 11 tibial insert was implanted and allowed for   appropriate range of motion with good alignment and good ligamentous balance.    The  patella tracked well with no thumbs test.  A deep Hemovac drain was   placed.  The arthrotomy was closed with #1 Vicryl figure-of-eight sutures and   this repair was stable to beyond 120 degrees of flexion with gravity.  Skin   was then closed in layers with 2-0 Vicryl subcutaneous and staples on the   skin.  This closure was done over a Hemovac drain.  Sterile dressing was   applied followed by a knee immobilizer.  The patient was awakened and   extubated in the operating room and transferred to recovery room in stable   condition.       ____________________________________     MD LARY Gill / KEZIA    DD:  03/13/2017 12:36:52  DT:  03/13/2017 14:59:39    D#:  [id number]  Job#:  [id number]

## 2017-03-13 NOTE — IP AVS SNAPSHOT
" <p align=\"LEFT\"><IMG SRC=\"//[email or web address]$/[email or web address]\" alt=\"Image\" WIDTH=\"50%\" HEIGHT=\"200\" BORDER=\"\"></p>                   Name:Pamela Monaco  Medical Record Number:[id number]  CSN: [id number]    YOB: 1940   Age: 77 y.o.  Sex: female  HT:1.6 m (5' 3\") WT: 72 kg (158 lb 11.7 oz)          Admit Date: 3/13/2017     Discharge Date:   Today's Date: 3/14/2017  Attending Doctor:  Pop Cartwright M.D.                  Allergies:  Penicillins          Follow-up Information     1. Follow up with Pop Cartwright M.D.. Schedule an appointment as soon as possible for a visit in 2 weeks.    Specialty:  Orthopaedics    Contact information    [address]  [address]  [facility] [id number] [phone]           Medication List      Take these Medications        Instructions    aspirin EC 81 MG Tbec   Commonly known as:  ECOTRIN    Take 81 mg by mouth every day.   Dose:  81 mg       enalapril 10 MG Tabs   Commonly known as:  VASOTEC    Take 20 mg by mouth every day.   Dose:  20 mg       ibuprofen 800 MG Tabs   Commonly known as:  MOTRIN    Take 800 mg by mouth every 6 hours as needed.   Dose:  800 mg       metformin 500 MG Tabs   Commonly known as:  GLUCOPHAGE    Take 500 mg by mouth every day.   Dose:  500 mg         "

## 2017-03-13 NOTE — OR SURGEON
Immediate Post-Operative Note      PreOp Diagnosis: R knee DJD    PostOp Diagnosis: R knee DJD    Procedure(s):  R KNEE ARTHROPLASTY TOTAL - Wound Class: Clean    Surgeon(s):  Pop Cartwright M.D.    Anesthesiologist/Type of Anesthesia:  Anesthesiologist: Sebas Horton M.D./General    Surgical Staff:  Circulator: Mely Laureano R.N.  Limb Cuadra: Latricia Bates  Scrub Person: Junior Durham Assist: Phillip Butts    Specimen: none    Estimated Blood Loss: min    Findings: above    Complications: none        3/13/2017 12:30 PM Pop Cartwright

## 2017-03-13 NOTE — OR NURSING
1231 received from or  Oral aairway dc'd  resp spont r knee dressing c/d/i  Brace intact  Pillow under ankle per Dr Cartwright  No c/o pain    Dp2+  hemovac compressed with small amt sang drainage  Shultz intact with clear yellow urine  1345 dresing remains clean dry and intact  Shakes head no to pain    Meets discharge criteria

## 2017-03-13 NOTE — PROGRESS NOTES
Received report from Sally PACU RN, assumed pt care. Pt transferred from PACU to GSU room 218. Pt A&Ox4, resting in bed. Dressing to R knee CDI, +CMS, cap refill less than 3 seconds, pt able to wiggle toes independently w/o difficulty. Immobilizer to R knee, polar ice in place. Pt taught to inform nurse if experiencing pain above comfort goal, SOB, chest pain, ambulating out of bed, or for any further assistance. Fall precautions in place, call light within reach. Will continue with care.

## 2017-03-14 VITALS
SYSTOLIC BLOOD PRESSURE: 118 MMHG | TEMPERATURE: 97.6 F | HEART RATE: 56 BPM | DIASTOLIC BLOOD PRESSURE: 63 MMHG | RESPIRATION RATE: 18 BRPM | WEIGHT: 158.73 LBS | HEIGHT: 63 IN | BODY MASS INDEX: 28.12 KG/M2 | OXYGEN SATURATION: 92 %

## 2017-03-14 LAB
ANION GAP SERPL CALC-SCNC: 4 MMOL/L (ref 0–11.9)
BUN SERPL-MCNC: 11 MG/DL (ref 8–22)
CALCIUM SERPL-MCNC: 9 MG/DL (ref 8.4–10.2)
CHLORIDE SERPL-SCNC: 110 MMOL/L (ref 96–112)
CO2 SERPL-SCNC: 24 MMOL/L (ref 20–33)
CREAT SERPL-MCNC: 0.78 MG/DL (ref 0.5–1.4)
GFR SERPL CREATININE-BSD FRML MDRD: >60 ML/MIN/1.73 M 2
GLUCOSE BLD-MCNC: 133 MG/DL (ref 65–99)
GLUCOSE BLD-MCNC: 139 MG/DL (ref 65–99)
GLUCOSE BLD-MCNC: 272 MG/DL (ref 65–99)
GLUCOSE SERPL-MCNC: 172 MG/DL (ref 65–99)
HCT VFR BLD AUTO: 28.6 % (ref 37–47)
HGB BLD-MCNC: 9.3 G/DL (ref 12–16)
POTASSIUM SERPL-SCNC: 4.9 MMOL/L (ref 3.6–5.5)
SODIUM SERPL-SCNC: 138 MMOL/L (ref 135–145)

## 2017-03-14 PROCEDURE — 700105 HCHG RX REV CODE 258: Performed by: ORTHOPAEDIC SURGERY

## 2017-03-14 PROCEDURE — 97165 OT EVAL LOW COMPLEX 30 MIN: CPT

## 2017-03-14 PROCEDURE — G8989 SELF CARE D/C STATUS: HCPCS | Mod: CI

## 2017-03-14 PROCEDURE — G8978 MOBILITY CURRENT STATUS: HCPCS | Mod: CK

## 2017-03-14 PROCEDURE — 700112 HCHG RX REV CODE 229: Performed by: ORTHOPAEDIC SURGERY

## 2017-03-14 PROCEDURE — G8980 MOBILITY D/C STATUS: HCPCS | Mod: CI

## 2017-03-14 PROCEDURE — A9270 NON-COVERED ITEM OR SERVICE: HCPCS | Performed by: ORTHOPAEDIC SURGERY

## 2017-03-14 PROCEDURE — G8979 MOBILITY GOAL STATUS: HCPCS | Mod: CI

## 2017-03-14 PROCEDURE — 700102 HCHG RX REV CODE 250 W/ 637 OVERRIDE(OP): Performed by: ORTHOPAEDIC SURGERY

## 2017-03-14 PROCEDURE — 36415 COLL VENOUS BLD VENIPUNCTURE: CPT

## 2017-03-14 PROCEDURE — G8988 SELF CARE GOAL STATUS: HCPCS | Mod: CI

## 2017-03-14 PROCEDURE — 97535 SELF CARE MNGMENT TRAINING: CPT

## 2017-03-14 PROCEDURE — 97116 GAIT TRAINING THERAPY: CPT

## 2017-03-14 PROCEDURE — 80048 BASIC METABOLIC PNL TOTAL CA: CPT

## 2017-03-14 PROCEDURE — 85018 HEMOGLOBIN: CPT

## 2017-03-14 PROCEDURE — 700111 HCHG RX REV CODE 636 W/ 250 OVERRIDE (IP): Performed by: ORTHOPAEDIC SURGERY

## 2017-03-14 PROCEDURE — 97110 THERAPEUTIC EXERCISES: CPT

## 2017-03-14 PROCEDURE — G8987 SELF CARE CURRENT STATUS: HCPCS | Mod: CI

## 2017-03-14 PROCEDURE — 97161 PT EVAL LOW COMPLEX 20 MIN: CPT

## 2017-03-14 PROCEDURE — 82962 GLUCOSE BLOOD TEST: CPT

## 2017-03-14 PROCEDURE — 85014 HEMATOCRIT: CPT

## 2017-03-14 PROCEDURE — 97530 THERAPEUTIC ACTIVITIES: CPT

## 2017-03-14 RX ADMIN — OXYCODONE HYDROCHLORIDE 5 MG: 5 TABLET ORAL at 15:09

## 2017-03-14 RX ADMIN — ACETAMINOPHEN 650 MG: 325 TABLET, FILM COATED ORAL at 11:39

## 2017-03-14 RX ADMIN — KETOROLAC TROMETHAMINE 15 MG: 30 INJECTION, SOLUTION INTRAMUSCULAR; INTRAVENOUS at 02:50

## 2017-03-14 RX ADMIN — SODIUM CHLORIDE 1000 ML: 9 INJECTION, SOLUTION INTRAVENOUS at 00:00

## 2017-03-14 RX ADMIN — KETOROLAC TROMETHAMINE 15 MG: 30 INJECTION, SOLUTION INTRAMUSCULAR; INTRAVENOUS at 06:30

## 2017-03-14 RX ADMIN — CELECOXIB 200 MG: 200 CAPSULE ORAL at 11:39

## 2017-03-14 RX ADMIN — ACETAMINOPHEN 650 MG: 325 TABLET, FILM COATED ORAL at 06:30

## 2017-03-14 RX ADMIN — DOCUSATE SODIUM 100 MG: 100 CAPSULE, LIQUID FILLED ORAL at 08:10

## 2017-03-14 RX ADMIN — ACETAMINOPHEN 650 MG: 325 TABLET, FILM COATED ORAL at 02:50

## 2017-03-14 RX ADMIN — METFORMIN HYDROCHLORIDE 500 MG: 500 TABLET, FILM COATED ORAL at 08:10

## 2017-03-14 RX ADMIN — OXYCODONE HYDROCHLORIDE 5 MG: 5 TABLET ORAL at 06:31

## 2017-03-14 RX ADMIN — OXYCODONE HYDROCHLORIDE 5 MG: 5 TABLET ORAL at 10:33

## 2017-03-14 RX ADMIN — ASPIRIN 325 MG: 325 TABLET, DELAYED RELEASE ORAL at 10:34

## 2017-03-14 RX ADMIN — SODIUM CHLORIDE 2 G: 9 INJECTION, SOLUTION INTRAVENOUS at 02:52

## 2017-03-14 ASSESSMENT — GAIT ASSESSMENTS
ASSISTIVE DEVICE: FRONT WHEEL WALKER
GAIT LEVEL OF ASSIST: CONTACT GUARD ASSIST
GAIT LEVEL OF ASSIST: SUPERVISED
DEVIATION: DECREASED HEEL STRIKE;DECREASED TOE OFF
DEVIATION: STEP TO;ANTALGIC;DECREASED HEEL STRIKE;DECREASED TOE OFF
DISTANCE (FEET): 100
DISTANCE (FEET): 100
ASSISTIVE DEVICE: FRONT WHEEL WALKER

## 2017-03-14 ASSESSMENT — PAIN SCALES - WONG BAKER
WONGBAKER_NUMERICALRESPONSE: HURTS JUST A LITTLE BIT
WONGBAKER_NUMERICALRESPONSE: HURTS A LITTLE MORE

## 2017-03-14 ASSESSMENT — ACTIVITIES OF DAILY LIVING (ADL): TOILETING: INDEPENDENT

## 2017-03-14 ASSESSMENT — PAIN SCALES - GENERAL: PAINLEVEL_OUTOF10: 3

## 2017-03-14 NOTE — CARE PLAN
Problem: Safety  Goal: Will remain free from injury  Outcome: PROGRESSING AS EXPECTED  Bed in low position, treaded slipper socks on, and call light in reach. Pt instructed to call nurse if ambulating out of bed.     Problem: Venous Thromboembolism (VTW)/Deep Vein Thrombosis (DVT) Prevention:  Goal: Patient will participate in Venous Thrombosis (VTE)/Deep Vein Thrombosis (DVT)Prevention Measures  Outcome: PROGRESSING AS EXPECTED    03/13/17 1400   OTHER   Mechanical Prophylaxis SCDs, Sequentials with EFRAIN Hose   SCDs in place for DVT prophylaxis.

## 2017-03-14 NOTE — CARE PLAN
Problem: Venous Thromboembolism (VTW)/Deep Vein Thrombosis (DVT) Prevention:  Goal: Patient will participate in Venous Thrombosis (VTE)/Deep Vein Thrombosis (DVT)Prevention Measures    03/13/17 2103   OTHER   Risk Assessment Score 3   VTE RISK High   Mechanical Prophylaxis SCDs, Sequentials with EFRAIN Hose   Pharmacologic Prophylaxis Used (aspirin)         Problem: Pain Management  Goal: Pain level will decrease to patient’s comfort goal  Scheduled & PRN pain medications given - see MAR

## 2017-03-14 NOTE — PROGRESS NOTES
"C/O: No C/O    Blood pressure 108/49, pulse 51, temperature 36.3 °C (97.4 °F), resp. rate 18, height 1.6 m (5' 3\"), weight 72 kg (158 lb 11.7 oz), SpO2 91 %.       Recent Labs      03/14/17   0511   HEMOGLOBIN  9.3*   HEMATOCRIT  28.6*     Recent Labs      03/14/17   0511   SODIUM  138   POTASSIUM  4.9   CHLORIDE  110   CO2  24   GLUCOSE  172*   BUN  11   CREATININE  0.78   CALCIUM  9.0     Dressing: CD&I   Sensation: intact  Pulses: 2+   Motor: intact   X-ray: components well-positioned      Assessment:  POD 1 R TKA   Plan: PT/OT WBAT    "

## 2017-03-14 NOTE — PROGRESS NOTES
Pt resting comfortably in bed with family at bedside. Pt requesting to get up OOB after she finishes dinner.

## 2017-03-14 NOTE — PROGRESS NOTES
Discharging patient home per MD order.  Pt demonstrated understanding of discharge instructions, follow up appointments, home medications, prescriptions, home care for surgical wound, and nursing care instructions for R total knee.  Ambulating with standby assistance, voiding without difficulty, pain well controlled, tolerating oral medications, oxygen saturation greater than 90% , tolerating diet. Pt verbalized understanding of discharge instructions and educational handouts, all questions answered.  Dressing change to R knee completed, incision CDI, staples well approximated. Extra dressings for home. Pt discharged off unit with hospital escort at 1512.

## 2017-03-14 NOTE — THERAPY
"Physical Therapy Treatment completed.   Bed Mobility:  Supine to Sit: Stand by Assist  Transfers: Sit to Stand: Supervised  Gait: Level Of Assist: Supervised with Front-Wheel Walker       Plan of Care: Pt ready for DC when medically cleared  Discharge Recommendations: Equipment: No Equipment Needed. Post-acute therapy recommended after discharged home.     See \"Rehab Therapy-Acute\" Patient Summary Report for complete documentation.       "

## 2017-03-14 NOTE — PROGRESS NOTES
Received report from Latricia CARTER, assumed pt care. Pt A&Ox4, sitting up in recliner. Dressing to knee CDI, +CMS, cap refill less than 3 seconds, pt able to wiggle toes independently w/o difficulty. No c/o pain at this time, previously medicated by NOC RN. Polar ice to R knee. Pt taught to inform nurse if experiencing pain above comfort goal, SOB, chest pain, ambulating out of bed, or for any further assistance. Fall precautions in place, call light within reach. Will continue with care.

## 2017-03-14 NOTE — THERAPY
"Occupational Therapy Evaluation completed.   Functional Status:  A&Ox4. Belarusian speaking. WBAT RLE. Sup to sit with SBA. STS, ADL transfers with SBA. Ambulates ~70' using FWW with SBA. Toileting with SBA. LE dressing with Loki/CGA.    Plan of Care: Patient with no further skilled OT needs in the acute care setting at this time  Discharge Recommendations:  Equipment: Will Continue to Assess for Equipment Needs. Post-acute therapy recommended after discharged home.  D/C ready from OT.  See \"Rehab Therapy-Acute\" Patient Summary Report for complete documentation.    "

## 2017-03-14 NOTE — DISCHARGE INSTRUCTIONS
Discharge Instructions    Discharged to home by car with relative. Discharged via wheelchair, hospital escort: Yes.  Special equipment needed: Walker    Be sure to schedule a follow-up appointment with your primary care doctor or any specialists as instructed.     Discharge Plan:   Influenza Vaccine Indication: Not indicated: Previously immunized this influenza season and > 8 years of age    I understand that a diet low in cholesterol, fat, and sodium is recommended for good health. Unless I have been given specific instructions below for another diet, I accept this instruction as my diet prescription.   Other diet: Diabetic    Special Instructions: Discharge instructions for the Orthopedic Patient    Follow up with Primary Care Physician within 2 weeks of discharge to home, regarding:  Review of medications and diagnostic testing.  Surveillance for medical complications.  Workup and treatment of osteoporosis, if appropriate.     -Is this a Joint Replacement patient? Yes Total Joint Knee Replacement Discharge Instructions    Pain  - The goal is to slowly wean off the prescription pain medicine.  - Ice can be used for pain control.  20 minutes at a time is recommended, and never directly against your skin or incision.  - Most patients are off the pain pills by 3 weeks; others may require a low level of pain medications for many months.  If your pain continues to be severe, follow up with your physician.  Infection    Knee joint infections; occur in fewer than 2% of patients. The most common causes of infection following total knee replacement surgery are from bacteria that enter the bloodstream during dental procedures, urinary tract infections, or skin infections. These bacteria can lodge around your knee replacement and cause an infection.  - Keep the incision as clean and dry as possible.  - Always wash your hands before touching your incision.  - Skin infections tend to develop around 7-10 days after surgery;  most can be treated with oral antibiotics.  - Dental Care should be delayed for 3 months after surgery, your surgeon recommends taking a dose of antibiotics 1 hour prior to any dental procedure. After 2 years, most surgeons recommend antibiotics only before an extensive procedure.  Ask your surgeon what he recommends.  - Signs and symptoms of infection can include:  low grade fever, redness, pain, swelling and drainage from your incision.  Notify your surgeon immediately if you develop any of these symptoms.  Other instructions  - Bowel habits - constipation is extremely common and is caused by a combination of anesthesia, lack of mobility and pain medicine.  Use stool softeners or laxatives if necessary. It is important not to ignore this problem, as bowel obstructions can be a serious complication after joint replacement surgery.  - Mood/Energy Level - Many patients experience a lack of energy and endurance for up to 2-3 months after surgery.  Some may also feel down and can even become depressed.  This is likely due to the postoperative anemia, change in activity level, lack of sleep, pain medicine and just the emotional reaction to the surgery itself that is a big disruption in a person’s life.  This usually passes.  If symptoms persist, follow up with your primary physician.  - Returning to work - Your surgeon will give you more specific instructions. Depending on the type of activities you perform, it may be 6 to 8 weeks before you return to work.   Generally, if you work a sedentary job requiring little standing or walking, most patients may return within 2-6 weeks.  Manual labor jobs involving walking, lifting and standing may take longer. Your surgeon’s office can provide a release to part-time or light duty work early on in your recovery and progress you to full duty as able.    - Driving - If your left knee was replaced and you have an automatic transmission, you may be able to begin driving in a week or  so, provided you are no longer taking narcotic pain medication. If your right knee was replaced, avoid driving for 6 to 8 weeks. Remember that your reflexes may not be as sharp as before your surgery. Ask your surgeon for specific instructions.   - Avoiding falls - A fall during the first few weeks after surgery can damage your new knee and may result in a need for further surgery.   throw rugs and tack down loose carpeting.  Be aware of floor hazards such as pets, small objects or uneven surfaces.    - Airport Metal Detectors - The sensitivity of metal detectors varies and it is likely that your prosthesis will cause an alarm.  Inform the  of your artificial joint.  Diet  - Resume your normal diet as tolerated.  - It is important to achieve a healthy nutritional status by eating a well balanced diet on a regular basis.  - Your physician may recommend that you take iron and vitamin supplements.   - Continue to drink plenty of fluids.  Shower/Bathing  - You may shower as soon as you get home from the hospital unless otherwise instructed.  - Keep your incision out of water.  To keep the incision dry when showering, cover it with a plastic bag or plastic wrap.  - Pat incision dry if it gets wet.  Don’t rub.  - Do not submerge in a bath until staples are out and the incision is completely healed. (Approximately 6-8 weeks)  Dressing Change:  Procedure (if recommended by your physician)  - Wash hands.  - Open all new dressing change materials.  - Remove old dressing and discard.  - Inspect incision for redness, increase in clear drainage, yellow/green drainage, odor and surrounding skin hot to touch.  -  ABD (large gauze) pad or “island dressing” by one corner and lay over the incision.  Be careful not to touch the inside of the dressing that will lay over the incision.  - Secure in place as instructed (Ace wrap or tape).    Swelling/Bruising    - Swelling can last from 3-6 months.  - Elevate  your leg higher than your heart while reclining.   The first week you are home you should elevate your leg an equal amount of time, as you are active.    - Anti-inflammatory pills can be taken once you have stopped the blood thinners.  - The swelling is usually worse after you go home since you are upright for longer periods of time.  - Bruising is common and can involve the entire leg including the thigh, calf and even foot. Bruising often does not appear until after you arrive home and it can be quite dramatic- purple, black, and green.  The bruising you can see is not usually concerning and will subside without any treatment.      Blood Clot Prevention  Blood clots in the legs and the less common, but frightening, clots that travel to the lungs are a real focus of our preventative. Most patients are at standard risk for them, but those patients who are at higher risk include people who have had previous clots, a family history of clotting, smoking, diabetes, obesity, advanced age, use of estrogen and a sedentary lifestyle.    - Signs of blood clots in legs - Swelling in thigh, calf or ankle that does not go down with elevation.  Pain, heat and tenderness in calf, back of calf or groin area.  NOTE: blood clots can occur in either leg.  - You have been receiving anticoagulant therapy (blood thinners) in the hospital and you may be instructed to continue at home depending on your risk factors.  - Your risk for developing a clot continues for up to 2-3 months after surgery.  You should avoid prolonged sitting and dehydration during that time (long air trips and car trips).  If you do take a trip during this time, please get up and move around every 1- 1.5 hours.  - If you are prescribed blood-thinning medication for home, follow instructions as directed. (Handouts provided if applicable).      Activity  Once home, you should continue to stay active. The key is to remember not to overdo it! While you can expect some  good days and some bad days, you should notice a gradual improvement and a gradual increase in your endurance over the next 6 to 12 months. Exercise is a critical component of home care, particularly during the first few weeks after surgery.     - Normal activities of daily living You should be able to resume most within 3 to 6 weeks following surgery. Some pain with activity and at night is common for several weeks after surgery  -  Physical Therapy Exercises - Continue to do the exercises prescribed for at least two months after surgery. Riding a stationary bicycle can help maintain muscle tone and keep your knee flexible. Try to achieve the maximum degree of bending and extension possible. (handout provided by Therapist).  - Sexual Activity -. Your surgeon can tell you when it safe to resume sexual activity.    - Sleeping Positions - You can safely sleep on your back, on either side, or on your stomach.   - Other Activities - Walk as much as you like, but remember that walking is no substitute for the exercises your doctor and physical therapist will prescribe. Lower impact activities are preferred.  If you have specific questions, consult your Surgeon.    When to Call the Doctor   Call the physician if:   - Fever over 100.5? F  - Increased pain, drainage, redness, odor or heat around the incision area  - Shaking chills  - Increased knee pain with activity and rest  - Increased pain in calf, tenderness or redness above or below the knee  - Increased swelling of calf, ankle, foot  - Sudden increased shortness of breath, sudden onset of chest pain, localized chest pain with coughing  - Incision opening  Or, if there are any questions or concerns about medications or care.       -Is this patient being discharged with medication to prevent blood clots?  Yes, Aspirin Aspirin, ASA oral tablets  What is this medicine?  ASPIRIN (AS pir in) is a pain reliever. It is used to treat mild pain and fever. This medicine is also  used as directed by a doctor to prevent and to treat heart attacks, to prevent strokes, and to treat arthritis or inflammation.  This medicine may be used for other purposes; ask your health care provider or pharmacist if you have questions.  COMMON BRAND NAME(S): Aspir-Low, Aspir-Vita , Aspirtab , May Advanced Aspirin, May Aspirin Extra Strength, May Aspirin Plus, May Aspirin, May Genuine Aspirin, May Womens Aspirin , Bufferin Extra Strength, Bufferin Low Dose, Bufferin  What should I tell my health care provider before I take this medicine?  They need to know if you have any of these conditions:  -anemia  -asthma  -bleeding problems  -child with chickenpox, the flu, or other viral infection  -diabetes  -gout  -if you frequently drink alcohol containing drinks  -kidney disease  -liver disease  -low level of vitamin K  -lupus  -smoke tobacco  -stomach ulcers or other problems  -an unusual or allergic reaction to aspirin, tartrazine dye, other medicines, dyes, or preservatives  -pregnant or trying to get pregnant  -breast-feeding  How should I use this medicine?  Take this medicine by mouth with a glass of water. Follow the directions on the package or prescription label. You can take this medicine with or without food. If it upsets your stomach, take it with food. Do not take your medicine more often than directed.  Talk to your pediatrician regarding the use of this medicine in children. While this drug may be prescribed for children as young as 12 years of age for selected conditions, precautions do apply. Children and teenagers should not use this medicine to treat chicken pox or flu symptoms unless directed by a doctor.  Patients over 65 years old may have a stronger reaction and need a smaller dose.  Overdosage: If you think you have taken too much of this medicine contact a poison control center or emergency room at once.  NOTE: This medicine is only for you. Do not share this medicine with  others.  What if I miss a dose?  If you are taking this medicine on a regular schedule and miss a dose, take it as soon as you can. If it is almost time for your next dose, take only that dose. Do not take double or extra doses.  What may interact with this medicine?  Do not take this medicine with any of the following medications:  -cidofovir  -ketorolac  -probenecid  This medicine may also interact with the following medications:  -alcohol  -alendronate  -bismuth subsalicylate  -flavocoxid  -herbal supplements like feverfew, garlic, moises, ginkgo biloba, horse chestnut  -medicines for diabetes or glaucoma like acetazolamide, methazolamide  -medicines for gout  -medicines that treat or prevent blood clots like enoxaparin, heparin, ticlopidine, warfarin  -other aspirin and aspirin-like medicines  -NSAIDs, medicines for pain and inflammation, like ibuprofen or naproxen  -pemetrexed  -sulfinpyrazone  -varicella live vaccine  This list may not describe all possible interactions. Give your health care provider a list of all the medicines, herbs, non-prescription drugs, or dietary supplements you use. Also tell them if you smoke, drink alcohol, or use illegal drugs. Some items may interact with your medicine.  What should I watch for while using this medicine?  If you are treating yourself for pain, tell your doctor or health care professional if the pain lasts more than 10 days, if it gets worse, or if there is a new or different kind of pain. Tell your doctor if you see redness or swelling. Also, check with your doctor if you have a fever that lasts for more than 3 days. Only take this medicine to prevent heart attacks or blood clotting if prescribed by your doctor or health care professional.  Do not take aspirin or aspirin-like medicines with this medicine. Too much aspirin can be dangerous. Always read the labels carefully.  This medicine can irritate your stomach or cause bleeding problems. Do not smoke cigarettes  or drink alcohol while taking this medicine. Do not lie down for 30 minutes after taking this medicine to prevent irritation to your throat.  If you are scheduled for any medical or dental procedure, tell your healthcare provider that you are taking this medicine. You may need to stop taking this medicine before the procedure.  What side effects may I notice from receiving this medicine?  Side effects that you should report to your doctor or health care professional as soon as possible:  -allergic reactions like skin rash, itching or hives, swelling of the face, lips, or tongue  -black, tarry stools  -bloody, coffee ground-like vomit  -breathing problems  -changes in hearing, ringing in the ears  -confusion  -general ill feeling or flu-like symptoms  -pain on swallowing  -redness, blistering, peeling or loosening of the skin, including inside the mouth or nose  -trouble passing urine or change in the amount of urine  -unusual bleeding or bruising  -unusually weak or tired  -yellowing of the eyes or skin  Side effects that usually do not require medical attention (report to your doctor or health care professional if they continue or are bothersome):  -diarrhea or constipation  -nausea, vomiting  -stomach gas, heartburn  This list may not describe all possible side effects. Call your doctor for medical advice about side effects. You may report side effects to FDA at 5-074-FDA-1072.  Where should I keep my medicine?  Keep out of the reach of children.  Store at room temperature between 15 and 30 degrees C (59 and 86 degrees F). Protect from heat and moisture. Do not use this medicine if it has a strong vinegar smell. Throw away any unused medicine after the expiration date.  NOTE: This sheet is a summary. It may not cover all possible information. If you have questions about this medicine, talk to your doctor, pharmacist, or health care provider.  © 2014, [employer]. (3/10/2009 10:44:17 AM)      · Is  patient discharged on Warfarin / Coumadin?   No     · Is patient Post Blood Transfusion?  No    Depression / Suicide Risk    As you are discharged from this [facility] facility, it is important to learn how to keep safe from harming yourself.    Recognize the warning signs:  · Abrupt changes in personality, positive or negative- including increase in energy   · Giving away possessions  · Change in eating patterns- significant weight changes-  positive or negative  · Change in sleeping patterns- unable to sleep or sleeping all the time   · Unwillingness or inability to communicate  · Depression  · Unusual sadness, discouragement and loneliness  · Talk of wanting to die  · Neglect of personal appearance   · Rebelliousness- reckless behavior  · Withdrawal from people/activities they love  · Confusion- inability to concentrate     If you or a loved one observes any of these behaviors or has concerns about self-harm, here's what you can do:  · Talk about it- your feelings and reasons for harming yourself  · Remove any means that you might use to hurt yourself (examples: pills, rope, extension cords, firearm)  · Get professional help from the community (Mental Health, Substance Abuse, psychological counseling)  · Do not be alone:Call your Safe Contact- someone whom you trust who will be there for you.  · Call your local CRISIS HOTLINE 909-4013 or 719-934-4074  · Call your local Children's Mobile Crisis Response Team [place] (174) 724-8653 or www.[employer]  · Call the toll free National Suicide Prevention Hotlines   · National Suicide Prevention Lifeline 541-675-DUMS (9035)  · National Hope Line Network 800-SUICIDE (638-4096)

## 2017-03-14 NOTE — THERAPY
"Physical Therapy Evaluation completed.   Bed Mobility:  Supine to Sit:  (NT, pt sitting up in chair)  Transfers: Sit to Stand: Contact Guard Assist  Gait: Level Of Assist: Contact Guard Assist with Front-Wheel Walker       Plan of Care: Will benefit from Physical Therapy 7 times per week  Discharge Recommendations: Equipment: No Equipment Needed. Post-acute therapy recommended after discharged home.    See \"Rehab Therapy-Acute\" Patient Summary Report for complete documentation.     "

## 2017-03-14 NOTE — DISCHARGE PLANNING
Care Transition Team Assessment    Information Source  Orientation : Oriented x 4  Information Given By: Relative  Informant's Name: Pt granddaughter  Who is responsible for making decisions for patient? : Patient    Readmission Evaluation  Is this a readmission?: No    Elopement Risk  Legal Hold: No  Ambulatory or Self Mobile in Wheelchair: No-Not an Elopement Risk  Elopement Risk: Not at Risk for Elopement    Interdisciplinary Discharge Planning  Does Admitting Nurse Feel This Could be a Complex Discharge?: No  Lives with - Patient's Self Care Capacity: Other (Comments) (Uncle, pt from [facility])  Support Systems: Children, Family Member(s), Friends / Neighbors  Housing / Facility: [address]  Do You Take your Prescribed Medications Regularly: Yes  Able to Return to Previous ADL's: Future Time w/Therapy  Mobility Issues: No  Prior Services: None  Patient Expects to be Discharged to:: Home  Assistance Needed: Yes  Durable Medical Equipment: Walker    Discharge Preparedness  What is your plan after discharge?: Home with help  What are your discharge supports?: Child  Prior Functional Level: Needs Assist with Activities of Daily Living, Independent with Medication Management, Uses Walker  Difficulity with ADLs: Walking  Difficulty with ADLs Comment: Pt uses walker  Difficulity with IADLs: Driving, Shopping  Difficulity with IADL Comments: Pt no longer drives, uses walker    Functional Assesment  Prior Functional Level: Needs Assist with Activities of Daily Living, Independent with Medication Management, Uses Walker    Finances  Financial Barriers to Discharge: No  Prescription Coverage: Yes (Pharmacy: [facility])    Vision / Hearing Impairment  Vision Impairment : No  Hearing Impairment : No    Values / Beliefs / Concerns  Values / Beliefs Concerns : No  Spiritual Requests During Hospitalization: No    Advance Directive  Advance Directive?: None  Advance Directive offered?: AD Booklet refused    Domestic Abuse  Have you ever  been the victim of abuse or violence?: No  Physical Abuse or Sexual Abuse: No  Verbal Abuse or Emotional Abuse: No  Possible Abuse Reported to:: Not Applicable    Psychological Assessment  History of Substance Abuse: None  History of Psychiatric Problems: No  Non-compliant with Treatment: No  Newly Diagnosed Illness: No    Discharge Risks or Barriers  Discharge risks or barriers?: No    Anticipated Discharge Information  Anticipated discharge disposition: Home  Discharge Address: [address] [place] [address] [place] [address]  Discharge Contact Phone Number: [phone]

## 2017-03-14 NOTE — PROGRESS NOTES
Hemovac d/c'd by Dr. Cartwright. Shultz catheter d/c'd by RN, balloon intact. Grace PT in to work with pt.

## 2017-03-15 NOTE — DISCHARGE SUMMARY
ADMISSION DIAGNOSIS:  Right knee degenerative joint disease.    DISCHARGE DIAGNOSIS:  Right knee degenerative joint disease.    PROCEDURE:  Right total knee arthroplasty.    COMPLICATIONS:  None.    HISTORY OF PRESENT ILLNESS:  The patient is a 77-year-old woman with a history   of severe right knee pain secondary to DJD and refractory to conservative   management.  She was indicated for total knee arthroplasty.    HOSPITAL COURSE:  The patient underwent an uncomplicated right total knee   arthroplasty on the day of admission.  She recovered well on the orthopedics   floor and was felt that she would be ready for discharge to home on   postoperative day #1.    DISPOSITION:  The patient will be discharged to home.  She will be   weightbearing as tolerated with a walker.  She will start outpatient physical   therapy.  She is to continue aspirin 325 p.o. b.i.d. for 5 weeks.  She will   follow up in my office in 10-14 days.       ____________________________________     MD LARY Gill / KEZIA    DD:  03/14/2017 08:22:19  DT:  03/14/2017 14:12:46    D#:  [id number]  Job#:  [id number]

## 2017-03-22 ENCOUNTER — HOSPITAL ENCOUNTER (OUTPATIENT)
Dept: RADIOLOGY | Facility: MEDICAL CENTER | Age: 77
End: 2017-03-22
Attending: PHYSICIAN ASSISTANT
Payer: MEDICARE

## 2017-03-22 ENCOUNTER — OFFICE VISIT (OUTPATIENT)
Dept: URGENT CARE | Facility: PHYSICIAN GROUP | Age: 77
End: 2017-03-22
Payer: MEDICARE

## 2017-03-22 VITALS
OXYGEN SATURATION: 96 % | TEMPERATURE: 98.6 F | DIASTOLIC BLOOD PRESSURE: 74 MMHG | HEART RATE: 66 BPM | HEIGHT: 63 IN | SYSTOLIC BLOOD PRESSURE: 140 MMHG

## 2017-03-22 DIAGNOSIS — R11.0 NAUSEA: ICD-10-CM

## 2017-03-22 DIAGNOSIS — M79.661 PAIN AND SWELLING OF RIGHT LOWER LEG: ICD-10-CM

## 2017-03-22 DIAGNOSIS — R07.89 RIGHT-SIDED CHEST WALL PAIN: ICD-10-CM

## 2017-03-22 DIAGNOSIS — M79.89 PAIN AND SWELLING OF RIGHT LOWER LEG: ICD-10-CM

## 2017-03-22 PROCEDURE — G8484 FLU IMMUNIZE NO ADMIN: HCPCS | Performed by: PHYSICIAN ASSISTANT

## 2017-03-22 PROCEDURE — 99203 OFFICE O/P NEW LOW 30 MIN: CPT | Performed by: PHYSICIAN ASSISTANT

## 2017-03-22 PROCEDURE — 1111F DSCHRG MED/CURRENT MED MERGE: CPT | Performed by: PHYSICIAN ASSISTANT

## 2017-03-22 PROCEDURE — G8432 DEP SCR NOT DOC, RNG: HCPCS | Performed by: PHYSICIAN ASSISTANT

## 2017-03-22 PROCEDURE — G8417 CALC BMI ABV UP PARAM F/U: HCPCS | Performed by: PHYSICIAN ASSISTANT

## 2017-03-22 PROCEDURE — 1036F TOBACCO NON-USER: CPT | Performed by: PHYSICIAN ASSISTANT

## 2017-03-22 PROCEDURE — 3017F COLORECTAL CA SCREEN DOC REV: CPT | Performed by: PHYSICIAN ASSISTANT

## 2017-03-22 PROCEDURE — 93971 EXTREMITY STUDY: CPT | Mod: RT

## 2017-03-22 PROCEDURE — 1101F PT FALLS ASSESS-DOCD LE1/YR: CPT | Mod: 8P | Performed by: PHYSICIAN ASSISTANT

## 2017-03-22 PROCEDURE — 4040F PNEUMOC VAC/ADMIN/RCVD: CPT | Mod: 8P | Performed by: PHYSICIAN ASSISTANT

## 2017-03-22 RX ORDER — TIZANIDINE HYDROCHLORIDE 4 MG/1
4 CAPSULE, GELATIN COATED ORAL 3 TIMES DAILY
Qty: 90 CAP | Refills: 0 | Status: SHIPPED | OUTPATIENT
Start: 2017-03-22 | End: 2021-03-25

## 2017-03-22 RX ORDER — ONDANSETRON 4 MG/1
4 TABLET, ORALLY DISINTEGRATING ORAL ONCE
Status: COMPLETED | OUTPATIENT
Start: 2017-03-22 | End: 2017-03-22

## 2017-03-22 RX ORDER — ONDANSETRON 4 MG/1
4 TABLET, FILM COATED ORAL EVERY 4 HOURS PRN
Qty: 20 TAB | Refills: 0 | Status: SHIPPED | OUTPATIENT
Start: 2017-03-22 | End: 2021-12-09 | Stop reason: CLARIF

## 2017-03-22 RX ADMIN — ONDANSETRON 4 MG: 4 TABLET, ORALLY DISINTEGRATING ORAL at 18:31

## 2017-03-22 ASSESSMENT — ENCOUNTER SYMPTOMS
ABDOMINAL PAIN: 0
MUSCULOSKELETAL NEGATIVE: 1
FEVER: 0
DIARRHEA: 0
SHORTNESS OF BREATH: 0
SORE THROAT: 0
CONSTIPATION: 0
VOMITING: 0
SPUTUM PRODUCTION: 0
NAUSEA: 1
CHILLS: 0
DIZZINESS: 0

## 2017-03-22 NOTE — MR AVS SNAPSHOT
"        Pamela Monaco   3/22/2017 4:15 PM   Office Visit   MRN: [id number]    Department:  [facility]   Dept Phone:  [phone]    Description:  Female : 1940   Provider:  Ashley Kaiser PA-C           Reason for Visit     Chest Pain (R) sided chest pain x 6 days, today was worse      Allergies as of 3/22/2017     Allergen Noted Reactions    Penicillins 2011   Rash    .      You were diagnosed with     Chest pain, unspecified type   [7333704]       Pain and swelling of right lower leg   [[id number]]       Nausea   [[id number]]         Vital Signs     Blood Pressure Pulse Temperature Height Oxygen Saturation Smoking Status    140/74 mmHg 66 37 °C (98.6 °F) 1.6 m (5' 2.99\") 96% Never Smoker       Basic Information     Date Of Birth Sex Race Ethnicity Preferred Language    1940 Female  or   Origin (Mongolian,Ecuadorean,St Helenian,St Helenian, etc) Mongolian      Problem List              ICD-10-CM Priority Class Noted - Resolved    Primary osteoarthritis of right knee M17.11   3/13/2017 - Present      Health Maintenance        Date Due Completion Dates    IMM DTaP/Tdap/Td Vaccine (1 - Tdap) 1961 ---    PAP SMEAR 1963 ---    COLONOSCOPY 1992 ---    IMM ZOSTER VACCINE 2002 ---    BONE DENSITY 2007 ---    IMM PNEUMOCOCCAL 65+ (ADULT) LOW/MEDIUM RISK SERIES (1 of 2 - PCV13) 2007 ---    MAMMOGRAM 2014, 2012, 2012    IMM INFLUENZA (1) 2016 ---            Current Immunizations     No immunizations on file.      Below and/or attached are the medications your provider expects you to take. Review all of your home medications and newly ordered medications with your provider and/or pharmacist. Follow medication instructions as directed by your provider and/or pharmacist. Please keep your medication list with you and share with your provider. Update the information when medications are discontinued, doses are changed, or new medications " (including over-the-counter products) are added; and carry medication information at all times in the event of emergency situations     Allergies:  PENICILLINS - Rash               Medications  Valid as of: March 22, 2017 -  7:17 PM    Generic Name Brand Name Tablet Size Instructions for use    Aspirin (Tablet Delayed Response) ECOTRIN 81 MG Take 81 mg by mouth every day.        Enalapril Maleate (Tab) VASOTEC 10 MG Take 20 mg by mouth every day.        Ibuprofen (Tab) MOTRIN 800 MG Take 800 mg by mouth every 6 hours as needed.        MetFORMIN HCl (Tab) GLUCOPHAGE 500 MG Take 500 mg by mouth every day.        Ondansetron HCl (Tab) ZOFRAN 4 MG Take 1 Tab by mouth every four hours as needed for Nausea/Vomiting.        OxyCODONE HCl   Take  by mouth.        .                 Medicines prescribed today were sent to:     [facility] [address] [place] [address] [place], [place] - [address] [facility] [address]    [address] [place] [address]    Phone: [phone] Fax: [phone]    Open 24 Hours?: No      Medication refill instructions:       If your prescription bottle indicates you have medication refills left, it is not necessary to call your provider’s office. Please contact your pharmacy and they will refill your medication.    If your prescription bottle indicates you do not have any refills left, you may request refills at any time through one of the following ways: The online [employer] system (except Urgent Care), by calling your provider’s office, or by asking your pharmacy to contact your provider’s office with a refill request. Medication refills are processed only during regular business hours and may not be available until the next business day. Your provider may request additional information or to have a follow-up visit with you prior to refilling your medication.   *Please Note: Medication refills are assigned a new Rx number when refilled electronically. Your pharmacy may indicate that no refills  were authorized even though a new prescription for the same medication is available at the pharmacy. Please request the medicine by name with the pharmacy before contacting your provider for a refill.        Your To Do List     Future Labs/Procedures Complete By Expires    US-EXTREMITY VENOUS UNILATERAL-LOWER RIGHT  As directed 3/22/2018         M-Audio Access Code: [email or web address]  Expires: 4/13/2017  9:03 AM    M-Audio  A secure, online tool to manage your health information     [employer]’s M-Audio® is a secure, online tool that connects you to your personalized health information from the privacy of your home -- day or night - making it very easy for you to manage your healthcare. Once the activation process is completed, you can even access your medical information using the M-Audio nishi, which is available for free in the Apple Nishi store or Google Play store.     M-Audio provides the following levels of access (as shown below):   My Chart Features   [facility] Primary Care Doctor [facility]  Specialists [facility]  Urgent  Care Non-[facility]  Primary Care  Doctor   Email your healthcare team securely and privately 24/7 X X X    Manage appointments: schedule your next appointment; view details of past/upcoming appointments X      Request prescription refills. X      View recent personal medical records, including lab and immunizations X X X X   View health record, including health history, allergies, medications X X X X   Read reports about your outpatient visits, procedures, consult and ER notes X X X X   See your discharge summary, which is a recap of your hospital and/or ER visit that includes your diagnosis, lab results, and care plan. X X       How to register for M-Audio:  1. Go to  [email or web address].  2. Click on the Sign Up Now box, which takes you to the New Member Sign Up page. You will need to provide the following information:  a. Enter your M-Audio Access Code exactly as it appears at the top of this  page. (You will not need to use this code after you’ve completed the sign-up process. If you do not sign up before the expiration date, you must request a new code.)   b. Enter your date of birth.   c. Enter your home email address.   d. Click Submit, and follow the next screen’s instructions.  3. Create a BUKA ID. This will be your BUKA login ID and cannot be changed, so think of one that is secure and easy to remember.  4. Create a BUKA password. You can change your password at any time.  5. Enter your Password Reset Question and Answer. This can be used at a later time if you forget your password.   6. Enter your e-mail address. This allows you to receive e-mail notifications when new information is available in BUKA.  7. Click Sign Up. You can now view your health information.    For assistance activating your BUKA account, call [phone]

## 2017-03-23 NOTE — PROGRESS NOTES
"Subjective:      Pamela Monaco is a 77 y.o. female who presents with Chest Pain    Patient is accompanied by her daughter, who is acting as .        Chest Pain   Associated symptoms include nausea. Pertinent negatives include no abdominal pain, dizziness, fever, shortness of breath, sputum production or vomiting.   Patient presents to urgent care with symptoms of right-sided chest pain. She states the chest pain started 6 days ago suddenly, and she describes the pain as \"sharp\" and intermittent. Episodes last a couple seconds. No known injury. Patient also reports some nausea over the past couple days when eating.  Patient does have history of HTN, which is well controlled with enalapril. No history of angina, CAD, MI, or CVA. She does have diabetes, that is well controlled with Metfomin. She recently had surgery on her right knee 12 days ago. No history of DVT, PE, smoking, estrogen use, or recent long trips. No known family history of cardiac disease.  Denies shortness of breath.     Review of Systems   Constitutional: Negative for fever and chills.   HENT: Negative for ear pain and sore throat.    Respiratory: Negative for sputum production and shortness of breath.    Cardiovascular: Positive for chest pain.   Gastrointestinal: Positive for nausea. Negative for vomiting, abdominal pain, diarrhea and constipation.   Genitourinary: Negative.    Musculoskeletal: Negative.    Neurological: Negative for dizziness.          Objective:     /74 mmHg  Pulse 66  Temp(Src) 37 °C (98.6 °F)  Ht 1.6 m (5' 2.99\")  SpO2 96%     Physical Exam   Constitutional: She is oriented to person, place, and time. She appears well-developed and well-nourished. No distress.   HENT:   Head: Normocephalic and atraumatic.   Eyes: Conjunctivae are normal. Pupils are equal, round, and reactive to light. Right eye exhibits no discharge. Left eye exhibits no discharge.   Neck: Normal range of motion.   Cardiovascular: " Normal rate, regular rhythm and normal heart sounds.    No murmur heard.  Pulmonary/Chest: Effort normal and breath sounds normal. No respiratory distress. She has no wheezes. She has no rales.       Patient points to right axilla/right lateral upper chest wall area when describing sharp intermittent pains. No noticeable bruising or deformity. No tenderness to palpation in the area.    Musculoskeletal: Normal range of motion.   Right lower extremity is slightly swollen compared to left lower extremity. Right knee has bandage over anterior knee joint, and compression dressing from mid thigh to mid calf. Some tenderness to posterior knee joint. Murali's negative.    Neurological: She is alert and oriented to person, place, and time.   Skin: Skin is warm and dry. She is not diaphoretic.   Psychiatric: She has a normal mood and affect. Her behavior is normal.   Nursing note and vitals reviewed.         PMH:  has a past medical history of Hypertension and Diabetes (CMS-[facility]).  MEDS:   Current outpatient prescriptions:   •  OXYCODONE HCL PO, Take  by mouth., Disp: , Rfl:   •  ondansetron (ZOFRAN) 4 MG Tab tablet, Take 1 Tab by mouth every four hours as needed for Nausea/Vomiting., Disp: 20 Tab, Rfl: 0  •  tizanidine (ZANAFLEX) 4 MG capsule, Take 1 Cap by mouth 3 times a day., Disp: 90 Cap, Rfl: 0  •  metformin (GLUCOPHAGE) 500 MG Tab, Take 500 mg by mouth every day., Disp: , Rfl:   •  aspirin EC (ECOTRIN) 81 MG Tablet Delayed Response, Take 81 mg by mouth every day., Disp: , Rfl:   •  enalapril (VASOTEC) 10 MG TABS, Take 20 mg by mouth every day., Disp: , Rfl:   •  ibuprofen (MOTRIN) 800 MG Tab, Take 800 mg by mouth every 6 hours as needed., Disp: , Rfl:   ALLERGIES:   Allergies   Allergen Reactions   • Penicillins Rash     .     SURGHX:   Past Surgical History   Procedure Laterality Date   • Other orthopedic surgery Right 2004?     Right knee surgery   • Knee arthroplasty total Right 3/13/2017     Procedure: KNEE  ARTHROPLASTY TOTAL;  Surgeon: Pop Cartwright M.D.;  Location: SURGERY [facility];  Service:      SOCHX:  reports that she has never smoked. She has never used smokeless tobacco. She reports that she does not drink alcohol or use illicit drugs.  FH: Family history is unknown by patient.       Assessment/Plan:     1. Right-sided chest wall pain  - POCT EKG --> Sinus rhythm, no signs of acute MI  - Chest pain appears to be musculoskeletal, possible muscle spasms. Will give patient muscle relaxants and encouraged to use OTC ibuprofen as needed (take with food)   - Patient's recent blood work show's no signs of kidney injury, normal BUN, Creatinine, and GFR on 3/14/17  - tizanidine (ZANAFLEX) 4 MG capsule; Take 1 Cap by mouth 3 times a day.  Dispense: 90 Cap; Refill: 0   - Will cause sedation, avoid driving, operating heavy machinery, and drinking alcohol  - Patient has appointment with her surgeon next week, will discuss recent symptoms with them as well    2. Pain and swelling of right lower leg  - US-EXTREMITY VENOUS UNILATERAL-LOWER RIGHT; Future   - No DVT seen    3. Nausea  - ondansetron (ZOFRAN ODT) dispertab 4 mg; Take 1 Tab by mouth Once.   - Significant improvement in symptoms in clinic  - ondansetron (ZOFRAN) 4 MG Tab tablet; Take 1 Tab by mouth every four hours as needed for Nausea/Vomiting.  Dispense: 20 Tab; Refill: 0    Follow up with PCP for any ongoing symptoms. Alarm symptoms discussed and patient advised to go to ER if she develops any symptoms discussed. The patient demonstrated a good understanding and agreed with the treatment plan.

## 2020-09-06 ENCOUNTER — OFFICE VISIT (OUTPATIENT)
Dept: URGENT CARE | Facility: CLINIC | Age: 80
End: 2020-09-06
Payer: MEDICARE

## 2020-09-06 VITALS
SYSTOLIC BLOOD PRESSURE: 128 MMHG | WEIGHT: 160 LBS | OXYGEN SATURATION: 98 % | DIASTOLIC BLOOD PRESSURE: 84 MMHG | BODY MASS INDEX: 28.35 KG/M2 | RESPIRATION RATE: 12 BRPM | HEIGHT: 63 IN | HEART RATE: 68 BPM | TEMPERATURE: 97.6 F

## 2020-09-06 DIAGNOSIS — K13.0 INFECTION OF LIP: ICD-10-CM

## 2020-09-06 DIAGNOSIS — S01.501A OPEN WOUND OF LIP, UNSPECIFIED OPEN WOUND TYPE, INITIAL ENCOUNTER: ICD-10-CM

## 2020-09-06 PROCEDURE — 99204 OFFICE O/P NEW MOD 45 MIN: CPT | Performed by: FAMILY MEDICINE

## 2020-09-06 RX ORDER — LEVOTHYROXINE SODIUM 0.07 MG/1
TABLET ORAL
COMMUNITY
Start: 2020-07-12 | End: 2020-09-07

## 2020-09-06 RX ORDER — ASPIRIN 81 MG/1
TABLET, CHEWABLE ORAL
COMMUNITY
Start: 2020-09-02 | End: 2020-09-07

## 2020-09-06 RX ORDER — NAPROXEN 500 MG/1
500 TABLET ORAL
COMMUNITY
Start: 2020-09-02 | End: 2021-12-09 | Stop reason: CLARIF

## 2020-09-06 RX ORDER — SERTRALINE HYDROCHLORIDE 100 MG/1
100 TABLET, FILM COATED ORAL DAILY
COMMUNITY
Start: 2020-08-19 | End: 2021-12-09 | Stop reason: CLARIF

## 2020-09-06 RX ORDER — ONDANSETRON 4 MG/1
TABLET, FILM COATED ORAL
COMMUNITY
Start: 2020-09-02 | End: 2020-09-07

## 2020-09-06 RX ORDER — MIRTAZAPINE 30 MG/1
15 TABLET, FILM COATED ORAL
COMMUNITY
Start: 2020-09-02 | End: 2021-12-09

## 2020-09-06 RX ORDER — DONEPEZIL HYDROCHLORIDE 5 MG/1
TABLET, FILM COATED ORAL
COMMUNITY
Start: 2020-09-02 | End: 2021-03-25

## 2020-09-06 RX ORDER — NAPROXEN 500 MG/1
TABLET ORAL
COMMUNITY
Start: 2020-08-19 | End: 2020-09-07

## 2020-09-06 RX ORDER — HYDROCHLOROTHIAZIDE 12.5 MG/1
12.5 TABLET ORAL DAILY
COMMUNITY
Start: 2020-06-10 | End: 2021-12-09 | Stop reason: CLARIF

## 2020-09-06 RX ORDER — ENALAPRIL MALEATE 20 MG/1
20 TABLET ORAL DAILY
COMMUNITY
Start: 2020-06-10 | End: 2021-12-27 | Stop reason: SDUPTHER

## 2020-09-06 RX ORDER — LEVOTHYROXINE SODIUM 0.07 MG/1
75 TABLET ORAL
COMMUNITY
Start: 2020-09-02 | End: 2021-12-27 | Stop reason: SDUPTHER

## 2020-09-06 RX ORDER — ENALAPRIL MALEATE 20 MG/1
TABLET ORAL
COMMUNITY
Start: 2020-09-02 | End: 2021-03-25

## 2020-09-06 RX ORDER — DOXYCYCLINE HYCLATE 100 MG
100 TABLET ORAL 2 TIMES DAILY
Qty: 14 TAB | Refills: 0 | Status: SHIPPED | OUTPATIENT
Start: 2020-09-06 | End: 2020-09-13

## 2020-09-06 RX ORDER — MIRTAZAPINE 15 MG/1
TABLET, ORALLY DISINTEGRATING ORAL
COMMUNITY
Start: 2020-09-03 | End: 2020-09-07

## 2020-09-06 RX ORDER — IBUPROFEN 200 MG
500 CAPSULE ORAL DAILY
COMMUNITY
Start: 2020-09-02

## 2020-09-06 RX ORDER — ZOLPIDEM TARTRATE 5 MG/1
TABLET ORAL
COMMUNITY
Start: 2020-08-20 | End: 2021-03-25

## 2020-09-06 RX ORDER — TRAZODONE HYDROCHLORIDE 50 MG/1
50 TABLET ORAL
COMMUNITY
Start: 2020-08-20 | End: 2021-12-09 | Stop reason: CLARIF

## 2020-09-06 RX ORDER — DONEPEZIL HYDROCHLORIDE 5 MG/1
TABLET, FILM COATED ORAL
COMMUNITY
Start: 2020-07-16 | End: 2020-09-07

## 2020-09-07 NOTE — PROGRESS NOTES
Chief Complaint:    Chief Complaint   Patient presents with   • Fall   • Mouth Injury       History of Present Illness:    Son present. This is a new problem. Patient fell 3 days ago, sustained bleeding wounds above mouth and to lower lip. Everything seems to be getting better except the lower lip, left of midline is swollen, and the wounds do not seem to be healing up, overall at least moderate severity. Otherwise, she seems at baseline and is not complaining of anything else.      Review of Systems:    Constitutional: Negative for fever, chills, and diaphoresis.   Eyes: Negative for change in vision, photophobia, pain, redness, and discharge.  ENT: Negative for ear pain, ear discharge, hearing loss, tinnitus, nasal congestion, nosebleeds, and sore throat.    Respiratory: Negative for cough, hemoptysis, sputum production, shortness of breath, wheezing, and stridor.    Cardiovascular: Negative for chest pain, palpitations, orthopnea, claudication, leg swelling, and PND.   Gastrointestinal: Negative for abdominal pain, nausea, vomiting, diarrhea, constipation, blood in stool, and melena.   Genitourinary: Negative for dysuria, urinary urgency, urinary frequency, hematuria, and flank pain.   Musculoskeletal: No new symptoms.  Skin: See above.  Neurological: No new symptoms.  Endo: Diabetic, on medication.  Heme: Does not bruise/bleed easily.   Psychiatric/Behavioral: No new symptoms.      Past Medical History:    Past Medical History:   Diagnosis Date   • Diabetes (HCC)    • Hypertension      Past Surgical History:    Past Surgical History:   Procedure Laterality Date   • KNEE ARTHROPLASTY TOTAL Right 3/13/2017    Procedure: KNEE ARTHROPLASTY TOTAL;  Surgeon: Pop Cartwright M.D.;  Location: SURGERY [facility];  Service:    • OTHER ORTHOPEDIC SURGERY Right 2004?    Right knee surgery     Social History:    Social History     Socioeconomic History   • Marital status:      Spouse name: Not on file   • Number  of children: Not on file   • Years of education: Not on file   • Highest education level: Not on file   Occupational History   • Not on file   Social Needs   • Financial resource strain: Not on file   • Food insecurity     Worry: Not on file     Inability: Not on file   • Transportation needs     Medical: Not on file     Non-medical: Not on file   Tobacco Use   • Smoking status: Never Smoker   • Smokeless tobacco: Never Used   Substance and Sexual Activity   • Alcohol use: No   • Drug use: No   • Sexual activity: Not on file   Lifestyle   • Physical activity     Days per week: Not on file     Minutes per session: Not on file   • Stress: Not on file   Relationships   • Social connections     Talks on phone: Not on file     Gets together: Not on file     Attends Dru[employer] service: Not on file     Active member of club or organization: Not on file     Attends meetings of clubs or organizations: Not on file     Relationship status: Not on file   • Intimate partner violence     Fear of current or ex partner: Not on file     Emotionally abused: Not on file     Physically abused: Not on file     Forced sexual activity: Not on file   Other Topics Concern   • Not on file   Social History Narrative   • Not on file     Family History:    Family History   Family history unknown: Yes     Medications:    Current Outpatient Medications on File Prior to Visit   Medication Sig Dispense Refill   • calcium carbonate (OS-LESLEY 500) 1250 (500 Ca) MG Tab CALCIUM 600 TABS     • donepezil (ARICEPT) 5 MG Tab ARICEPT 5 MG TABS     • hydroCHLOROthiazide (HYDRODIURIL) 12.5 MG tablet      • levothyroxine (SYNTHROID) 75 MCG Tab LEVOTHYROXINE SODIUM 75 MCG TABS     • mirtazapine (REMERON) 15 MG TABLET DISPERSIBLE MIRTAZAPINE 15 MG TBDP     • naproxen (NAPROSYN) 500 MG Tab NAPROXEN 500 MG TABS     • sertraline (ZOLOFT) 100 MG Tab      • traZODone (DESYREL) 50 MG Tab      • zolpidem (AMBIEN) 5 MG Tab      • enalapril (VASOTEC) 20 MG tablet ENALAPRIL  "MALEATE 20 MG TABS     • OXYCODONE HCL PO Take  by mouth.     • ondansetron (ZOFRAN) 4 MG Tab tablet Take 1 Tab by mouth every four hours as needed for Nausea/Vomiting. 20 Tab 0   • tizanidine (ZANAFLEX) 4 MG capsule Take 1 Cap by mouth 3 times a day. 90 Cap 0   • metformin (GLUCOPHAGE) 500 MG Tab Take 500 mg by mouth every day.     • ibuprofen (MOTRIN) 800 MG Tab Take 800 mg by mouth every 6 hours as needed.     • aspirin EC (ECOTRIN) 81 MG Tablet Delayed Response Take 81 mg by mouth every day.     • enalapril (VASOTEC) 20 MG tablet      • enalapril (VASOTEC) 10 MG TABS Take 20 mg by mouth every day.       No current facility-administered medications on file prior to visit.      Allergies:    Allergies   Allergen Reactions   • Penicillins Rash     .       Vitals:    Vitals:    09/06/20 2007   BP: 128/84   Pulse: 68   Resp: 12   Temp: 36.4 °C (97.6 °F)   TempSrc: Temporal   SpO2: 98%   Weight: 72.6 kg (160 lb)   Height: 1.6 m (5' 3\")       Physical Exam:    Constitutional: Vital signs reviewed. Appears well-developed and well-nourished. No acute distress.   Eyes: Sclera white, conjunctivae clear.   ENT: Lower lip, left of midline has evidence of multiple wounds and surrounding swelling. External ears normal. Hearing normal.   Neck: Neck supple.   Pulmonary/Chest: Respirations non-labored.  Lymph: Cervical nodes without tenderness or enlargement.  Musculoskeletal: In wheelchair.  Neurological: Alert. Muscle tone normal. Coordination normal.   Skin: Scabbed wound above mouth, non-infected.  Psychiatric: Normal mood and affect. Behavior is normal.       Assessment / Plan:    1. Infection of lip  - doxycycline (VIBRAMYCIN) 100 MG Tab; Take 1 Tab by mouth 2 times a day for 7 days.  Dispense: 14 Tab; Refill: 0    2. Open wound of lip, unspecified open wound type, initial encounter  - doxycycline (VIBRAMYCIN) 100 MG Tab; Take 1 Tab by mouth 2 times a day for 7 days.  Dispense: 14 Tab; Refill: 0      Discussed with them " DDX, management options, and risks, benefits, and alternatives to treatment plan agreed upon.    Agreeable to medication prescribed.    Discussed expected course of duration, time for improvement, and to seek follow-up in Emergency Room, urgent care, or with PCP if getting worse at any time or not improving within expected time frame.

## 2021-01-14 DIAGNOSIS — Z23 NEED FOR VACCINATION: ICD-10-CM

## 2021-02-22 ENCOUNTER — PHYSICAL THERAPY (OUTPATIENT)
Dept: PHYSICAL THERAPY | Facility: REHABILITATION | Age: 81
End: 2021-02-22
Attending: INTERNAL MEDICINE
Payer: MEDICARE

## 2021-02-22 DIAGNOSIS — M25.552 PAIN IN LEFT HIP: ICD-10-CM

## 2021-02-22 DIAGNOSIS — M16.12 UNILATERAL PRIMARY OSTEOARTHRITIS, LEFT HIP: ICD-10-CM

## 2021-02-22 PROCEDURE — 97161 PT EVAL LOW COMPLEX 20 MIN: CPT

## 2021-02-22 PROCEDURE — 97110 THERAPEUTIC EXERCISES: CPT

## 2021-02-22 SDOH — ECONOMIC STABILITY: GENERAL: QUALITY OF LIFE: GOOD

## 2021-02-22 ASSESSMENT — BALANCE ASSESSMENTS
SITTING UNSUPPORTED: 4
REACHING FORWARD WITH OUTSTRETCHED ARM WHILE STANDING: 2
STANDING UNSUPPORTED WITH EYES CLOSED: 3
STANDING ON ONE LEG: 0
SITTING TO STANDING: 2
STANDING UNSUPPORTED: 3
PLACE ALTERNATE FOOT ON STEP OR STOOL WHILE STANDING UNSUPPORTED: 2
LONG VERSION TOTAL SCORE (MAX 56): 29
STANDING UNSUPPORTED WITH FEET TOGETHER: 2
PICK UP OBJECT FROM THE FLOOR FROM A STANDING POSITION: 3
STANDING TO SITTING: 2
TRANSFERS: 3
LONG VERSION TOTAL SCORE (MAX 56): 29
STANDING UNSUPPORTED ONE FOOT IN FRONT: 1
TURN 360 DEGREES: 1
LOOK OVER LEFT AND RIGHT SHOULDERS WHILE STANDING: 1

## 2021-02-22 ASSESSMENT — ENCOUNTER SYMPTOMS
EXACERBATED BY: PROLONGED STANDING
PAIN TIMING: WHEN ACTIVE
ALLEVIATING FACTORS: REST
QUALITY: SHARP
PAIN SCALE AT LOWEST: 4
PAIN LOCATION: LEFT HIP
EXACERBATED BY: ACTIVITY
PAIN SCALE AT HIGHEST: 9
EXACERBATED BY: WALKING
PAIN SCALE: 6

## 2021-02-22 ASSESSMENT — ACTIVITIES OF DAILY LIVING (ADL)
POOR_BALANCE: 1
AMBULATES (FT): 100

## 2021-02-22 NOTE — OP THERAPY EVALUATION
Outpatient Physical Therapy  INITIAL EVALUATION    [facility] [address]  [address]  [place] [address]  Phone:  [phone]  Fax:  [phone]    Date of Evaluation: 2021    Patient: Pamela Monaco  YOB: 1940  MRN: [id number]     Referring Provider: Sánchez Ortez M.D.  [address] [place] [address]   Referring Diagnosis Unilateral primary osteoarthritis, left hip [M16.12];Pain in left hip [M25.552]     Time Calculation    Start time: 1305  Stop time: 1400 Time Calculation (min): 55 minutes         Chief Complaint: Hip Problem    Visit Diagnoses     ICD-10-CM   1. Unilateral primary osteoarthritis, left hip  M16.12   2. Pain in left hip  M25.552       No data found  Subjective:   History of Present Illness:     Date of onset:  2018    Mechanism of injury:  Patient is a 81 year old female who presents to physical therapy for evaluation and treatment of left hip pain. Reports that pain has been present and worsening for approximately 3 years. Pain reported to be severe in nature and greatly limiting ability to walk, perform household duties and ADLs. Patient has met with orthopedic surgeon and plans on having CORETTA, however, was recommended to do so pre-op therapy to address deficits and assess balance, ROM, and ability to use proper AD.       Patient speaks little English, patient's son present to provide translation services.   Quality of life:  Good  Prior level of function:  Significant hip pain x 3years, using 4ww for ambulation for last 2 years.   Headaches:  no headaches  Ear problems: none  Sleep disturbance:  Interrupted sleep  Pain:     Current pain ratin    At best pain ratin    At worst pain ratin    Location:  Left hip    Quality:  Sharp    Pain timing:  When active    Relieving factors:  Rest    Aggravating factors:  Activity, walking and prolonged standing    Pain Comments::  Patient reports pain with steps, son  reports 4-5 steps to get in to house, but that they are looking at having a ramp built.     Social Support:     Lives in:  One-story house    Lives with:  Adult children  Hand dominance:  Right  Treatments:     Current treatment:  Rest  Patient Goals:     Patient goals for therapy:  Decreased pain and increased strength      Past Medical History:   Diagnosis Date   • Diabetes (HCC)    • Hypertension      Past Surgical History:   Procedure Laterality Date   • KNEE ARTHROPLASTY TOTAL Right 3/13/2017    Procedure: KNEE ARTHROPLASTY TOTAL;  Surgeon: Pop Cartwright M.D.;  Location: SURGERY [facility];  Service:    • OTHER ORTHOPEDIC SURGERY Right 2004?    Right knee surgery     Social History     Tobacco Use   • Smoking status: Never Smoker   • Smokeless tobacco: Never Used   Substance Use Topics   • Alcohol use: No     Family and Occupational History     Socioeconomic History   • Marital status:      Spouse name: Not on file   • Number of children: Not on file   • Years of education: Not on file   • Highest education level: Not on file   Occupational History   • Not on file       Objective     Lumbar Screen  Lumbar range of motion within normal limits.    Neurological Testing     Sensation     Hip   Left Hip   Intact: light touch    Knee   Left Knee   Intact: light touch    Right Knee   Intact: light touch     Reflexes   Left   Patellar (L4): normal (2+)  Achilles (S1): normal (2+)    Right   Patellar (L4): normal (2+)  Achilles (S1): normal (2+)    Palpation   Left   Tenderness of the lumbar paraspinals.     Right   Tenderness of the lumbar paraspinals.     Tenderness     Left Hip   Tenderness in the greater trochanter.  No tenderness in the ASIS, PSIS and sacroiliac joint.     Active Range of Motion   Left Hip   Flexion: 75 degrees with pain  Extension: 3 degrees with pain  Abduction: 17 degrees with pain  External rotation (90/90): 10 degrees with pain  Internal rotation (90/90): 10 degrees with  pain    Right Hip   Normal active range of motion    Right Knee   Normal active range of motion    Additional Active Range of Motion Details  L knee active ROM 22-90 degrees, severely limited left knee extnesion. Unable to accurately assess extensor lag due to decreased strength/ability to perform SLR    Passive Range of Motion     Additional Passive Range of Motion Details  Patient very guarded secondary to pain, unable to measure true PROM, however, limited in all areas with severe crepitus (audible and tactile present) during passive movement.     Joint Play   Left Hip     Posterior hip: hypomobile    Anterior hip: hypomobile    Lateral hip: hypomobile    Long axis distraction: hypomobile    Strength:      Left Hip   Planes of Motion   Flexion: 3-  Extension: 3-  Abduction: 3-  Adduction: 3-  External rotation: 3-  Internal rotation: 3-    Right Hip   Planes of Motion   Flexion: 4  Extension: 4  Abduction: 4  Adduction: 4  External rotation: 4  Internal rotation: 4    Tests     Left Hip   Positive scour.   Negative SI compression and SI distraction.   Ambulation   Weight-Bearing Status   Distance in feet: 100  Assistive device used: four-wheeled walker    Ambulation: Level Surfaces     Additional Level Surfaces Ambulation Details  Patient performs gait on level surfaces at SPV level with 4ww. Cues for increased step length and step height.   Gait deviations include decreased stance time on left, short shuffling steps, decreased step length/height throughout. Decreased hip flexion during swing phase. Poor postural awareness and AD placement.     Patient and son report no falls over the prior 6 months.     Patient's on reports she intermittently uses quad cane at home for ambulation, however more recently has had increased use of 4ww. Patient is able to safely ambulate with 4ww and SPV, which is provided by family. Patient lives with her oldest son, and additional family members rotate staying with her as well. Son  reports that patient is never left alone and always has help present.     Ambulation: Stairs     Additional Stairs Ambulation Details  Patient able to ascend/descend stairs with SPV-Krystal depending on fatigue levels with UE support and non reciprocal pattern.     Observational Gait   Decreased walking speed and stride length. Stride width: cross over.          Therapeutic Exercises (CPT 30047):     1. Glut sets    2. Quad sets    3. STS    10. UPOC 3/22/2021      Time-based treatments/modalities:    Physical Therapy Timed Treatment Charges  Therapeutic exercise minutes (CPT 25606): 10 minutes      Assessment, Response and Plan:   Impairments: abnormal gait, abnormal or restricted ROM, impaired balance, impaired physical strength, lacks appropriate home exercise program and pain with function    Assessment details:  Ms. Monaco is a 81 year old female who presents to physical therapy for evaluation and treatment of left hip pain, worsening over the last 3 years. Patient has met with orthopedic surgeon and plans to have CORETTA, however was recommended to PT for pre-op testing and treatment. Patient with deficits including decreased strength, ROM, balance/stabilty, and significant pain with functional activities. Patient would benefit from skilled therapy services to address deficits seen during evaluation to allow for improved tolerance to activity, decreased fall risk, and improved response to and outcomes following upcoming CORETTA.   Barriers to therapy:  Age and comorbidities  Prognosis: fair    Goals:   Short Term Goals:   1. Establish initial HEP  2. Perform transfers at SPV-I level with most appropriate AD  Short term goal time span:  1-2 weeks      Long Term Goals:    1. Independent in HEP and progressions  2. 25% improvement in Levin Balance and LEFs scores or greater to show decreased fall risk and improved tolerance to functional activity  Long term goal time span:  2-4 weeks    Plan:   Therapy options:  Physical  therapy treatment to continue  Planned therapy interventions:  E Stim Unattended (CPT 15277), Therapeutic Activities (CPT 64704), Therapeutic Exercise (CPT 01228), Hot or Cold Pack Therapy (CPT 70583), Gait Training (CPT 82521), Manual Therapy (CPT 34036) and Neuromuscular Re-education (CPT 94964)  Frequency:  1x week  Duration in weeks:  3  Duration in visits:  3  Discussed with:  Patient and family      Functional Assessment Used  Levin Balance Total Score (0-56): 29  Lower Extremity Functional Scale Total: 12.5     Referring provider co-signature:  I have reviewed this plan of care and my co-signature certifies the need for services.    Certification Period: 02/22/2021 to  03/22/21    Physician Signature: ________________________________ Date: ______________

## 2021-03-01 ENCOUNTER — APPOINTMENT (OUTPATIENT)
Dept: PHYSICAL THERAPY | Facility: REHABILITATION | Age: 81
End: 2021-03-01
Attending: ORTHOPAEDIC SURGERY
Payer: MEDICARE

## 2021-03-01 DIAGNOSIS — M25.552 PAIN IN LEFT HIP: ICD-10-CM

## 2021-03-01 DIAGNOSIS — M16.12 UNILATERAL PRIMARY OSTEOARTHRITIS, LEFT HIP: ICD-10-CM

## 2021-03-01 PROCEDURE — 97110 THERAPEUTIC EXERCISES: CPT

## 2021-03-01 NOTE — OP THERAPY DAILY TREATMENT
Outpatient Physical Therapy  DAILY TREATMENT     [facility] [address].  [address]  [place] [address]  Phone:  [phone]  Fax:  [phone]    Date: 03/01/2021    Patient: Pamela Monaco  YOB: 1940  MRN: [id number]     Time Calculation    Start time: 1130  Stop time: 1220 Time Calculation (min): 50 minutes         Chief Complaint: Hip Problem    Visit #: 2    SUBJECTIVE:  In a little more pain today from walking from the car. Declines  - son interprets for patient.     OBJECTIVE:  Current objective measures:           Therapeutic Exercises (CPT 66429):     1. Nustepper , L0 5 min    3. NEW HEP as below    10. UPOC 3/22/2021      Therapeutic Exercise Summary: Access Code: [email or web address]  URL: [email or web address]  Date: 03/01/2021  Prepared by: Madisyn Domingo    Exercises  Seated Gluteal Sets - 10 reps - 3 sets - 1x daily - 7x weekly  Seated Hip Abduction with Resistance - 10 reps - 3 sets - 1x daily - 7x weekly      Time-based treatments/modalities:    Physical Therapy Timed Treatment Charges  Therapeutic exercise minutes (CPT 15872): 25 minutes    mhp at end of session l/s and L hip no charge 15'    ASSESSMENT:   Response to treatment: Reduced sx irritability with ROM and light loading.     PLAN/RECOMMENDATIONS:   Plan for treatment: therapy treatment to continue next visit.  Planned interventions for next visit: continue with current treatment. One additional visit to prehab for CORETTA.

## 2021-03-08 ENCOUNTER — APPOINTMENT (OUTPATIENT)
Dept: PHYSICAL THERAPY | Facility: REHABILITATION | Age: 81
End: 2021-03-08
Attending: ORTHOPAEDIC SURGERY
Payer: MEDICARE

## 2021-03-25 ENCOUNTER — PRE-ADMISSION TESTING (OUTPATIENT)
Dept: ADMISSIONS | Facility: MEDICAL CENTER | Age: 81
End: 2021-03-25
Attending: ORTHOPAEDIC SURGERY
Payer: MEDICARE

## 2021-03-25 DIAGNOSIS — Z01.812 PRE-OPERATIVE LABORATORY EXAMINATION: ICD-10-CM

## 2021-03-25 DIAGNOSIS — Z01.810 PRE-OPERATIVE CARDIOVASCULAR EXAMINATION: ICD-10-CM

## 2021-03-25 LAB
ANION GAP SERPL CALC-SCNC: 7 MMOL/L (ref 7–16)
APPEARANCE UR: CLEAR
BACTERIA #/AREA URNS HPF: ABNORMAL /HPF
BASOPHILS # BLD AUTO: 0.2 % (ref 0–1.8)
BASOPHILS # BLD: 0.02 K/UL (ref 0–0.12)
BILIRUB UR QL STRIP.AUTO: NEGATIVE
BUN SERPL-MCNC: 24 MG/DL (ref 8–22)
CALCIUM SERPL-MCNC: 10.2 MG/DL (ref 8.5–10.5)
CHLORIDE SERPL-SCNC: 102 MMOL/L (ref 96–112)
CO2 SERPL-SCNC: 26 MMOL/L (ref 20–33)
COLOR UR: YELLOW
CREAT SERPL-MCNC: 0.89 MG/DL (ref 0.5–1.4)
EKG IMPRESSION: NORMAL
EOSINOPHIL # BLD AUTO: 0.15 K/UL (ref 0–0.51)
EOSINOPHIL NFR BLD: 1.8 % (ref 0–6.9)
EPI CELLS #/AREA URNS HPF: ABNORMAL /HPF
ERYTHROCYTE [DISTWIDTH] IN BLOOD BY AUTOMATED COUNT: 47.1 FL (ref 35.9–50)
GLUCOSE SERPL-MCNC: 175 MG/DL (ref 65–99)
GLUCOSE UR STRIP.AUTO-MCNC: NEGATIVE MG/DL
HCT VFR BLD AUTO: 35.8 % (ref 37–47)
HGB BLD-MCNC: 11.3 G/DL (ref 12–16)
IMM GRANULOCYTES # BLD AUTO: 0.02 K/UL (ref 0–0.11)
IMM GRANULOCYTES NFR BLD AUTO: 0.2 % (ref 0–0.9)
KETONES UR STRIP.AUTO-MCNC: NEGATIVE MG/DL
LEUKOCYTE ESTERASE UR QL STRIP.AUTO: ABNORMAL
LYMPHOCYTES # BLD AUTO: 3.02 K/UL (ref 1–4.8)
LYMPHOCYTES NFR BLD: 36.7 % (ref 22–41)
MCH RBC QN AUTO: 28.2 PG (ref 27–33)
MCHC RBC AUTO-ENTMCNC: 31.6 G/DL (ref 33.6–35)
MCV RBC AUTO: 89.3 FL (ref 81.4–97.8)
MICRO URNS: ABNORMAL
MONOCYTES # BLD AUTO: 0.44 K/UL (ref 0–0.85)
MONOCYTES NFR BLD AUTO: 5.3 % (ref 0–13.4)
NEUTROPHILS # BLD AUTO: 4.59 K/UL (ref 2–7.15)
NEUTROPHILS NFR BLD: 55.8 % (ref 44–72)
NITRITE UR QL STRIP.AUTO: NEGATIVE
NRBC # BLD AUTO: 0 K/UL
NRBC BLD-RTO: 0 /100 WBC
PH UR STRIP.AUTO: 6.5 [PH] (ref 5–8)
PLATELET # BLD AUTO: 386 K/UL (ref 164–446)
PMV BLD AUTO: 9.9 FL (ref 9–12.9)
POTASSIUM SERPL-SCNC: 4.7 MMOL/L (ref 3.6–5.5)
PROT UR QL STRIP: NEGATIVE MG/DL
RBC # BLD AUTO: 4.01 M/UL (ref 4.2–5.4)
RBC # URNS HPF: ABNORMAL /HPF
RBC UR QL AUTO: ABNORMAL
SCCMEC + MECA PNL NOSE NAA+PROBE: NEGATIVE
SCCMEC + MECA PNL NOSE NAA+PROBE: POSITIVE
SODIUM SERPL-SCNC: 135 MMOL/L (ref 135–145)
SP GR UR STRIP.AUTO: 1.01
UROBILINOGEN UR STRIP.AUTO-MCNC: 0.2 MG/DL
WBC # BLD AUTO: 8.2 K/UL (ref 4.8–10.8)
WBC #/AREA URNS HPF: ABNORMAL /HPF

## 2021-03-25 PROCEDURE — 93010 ELECTROCARDIOGRAM REPORT: CPT | Performed by: INTERNAL MEDICINE

## 2021-03-25 PROCEDURE — 36415 COLL VENOUS BLD VENIPUNCTURE: CPT

## 2021-03-25 PROCEDURE — 87640 STAPH A DNA AMP PROBE: CPT | Mod: XU

## 2021-03-25 PROCEDURE — 80048 BASIC METABOLIC PNL TOTAL CA: CPT

## 2021-03-25 PROCEDURE — 93005 ELECTROCARDIOGRAM TRACING: CPT

## 2021-03-25 PROCEDURE — 85025 COMPLETE CBC W/AUTO DIFF WBC: CPT

## 2021-03-25 PROCEDURE — 81001 URINALYSIS AUTO W/SCOPE: CPT

## 2021-03-25 PROCEDURE — 87641 MR-STAPH DNA AMP PROBE: CPT

## 2021-03-25 RX ORDER — SENNOSIDES 8.6 MG
650 CAPSULE ORAL EVERY 6 HOURS PRN
COMMUNITY
Start: 2021-03-09

## 2021-03-25 RX ORDER — TRAMADOL HYDROCHLORIDE 50 MG/1
50 TABLET ORAL 2 TIMES DAILY PRN
COMMUNITY
Start: 2021-03-16 | End: 2021-12-09

## 2021-03-25 RX ORDER — ATORVASTATIN CALCIUM 40 MG/1
40 TABLET, FILM COATED ORAL DAILY
COMMUNITY
Start: 2020-12-26 | End: 2021-12-27 | Stop reason: SDUPTHER

## 2021-03-25 NOTE — DISCHARGE PLANNING
DISCHARGE PLANNING NOTE - TOTAL JOINT    Procedure: Procedure(s):  ARTHROPLASTY, HIP, TOTAL  Procedure Date: 4/8/2021  Insurance: Payor: MEDICARE / Plan: MEDICARE PART A & B     Equipment currently available at home?  bedside commode, four-wheel walker, shower chair and wheelchair  Steps into the home? 3  Steps within the home? 0  Toilet height? Standard  Type of shower? tub-shower  Who will be with you during your recovery? son  Is Outpatient Physical Therapy set up after surgery? No   Did you take the Total Joint Class and where? No   Planning same day discharge?unknown    Plan: Pamela was accompanied by her son, Lloyd, who interpreted for her.Reviewed Equipment Resource list and provided a copy to the patient. They are familiar with [facility] and will obtain a Tub Transfer Bench. She has 24/7 assistance from family and lives with a son. Anticipate discharge home with family support.

## 2021-03-26 NOTE — OR NURSING
Blood work and MRSA abnormal results were faxed to dr Nathan office. I also notified dr Ortez's MA [place] that I faxed the results to them

## 2021-04-03 ENCOUNTER — PRE-ADMISSION TESTING (OUTPATIENT)
Dept: ADMISSIONS | Facility: MEDICAL CENTER | Age: 81
End: 2021-04-03
Attending: ORTHOPAEDIC SURGERY
Payer: MEDICARE

## 2021-04-03 DIAGNOSIS — Z01.812 PRE-OPERATIVE LABORATORY EXAMINATION: ICD-10-CM

## 2021-04-03 LAB — COVID ORDER STATUS COVID19: NORMAL

## 2021-04-03 PROCEDURE — C9803 HOPD COVID-19 SPEC COLLECT: HCPCS

## 2021-04-03 PROCEDURE — U0003 INFECTIOUS AGENT DETECTION BY NUCLEIC ACID (DNA OR RNA); SEVERE ACUTE RESPIRATORY SYNDROME CORONAVIRUS 2 (SARS-COV-2) (CORONAVIRUS DISEASE [COVID-19]), AMPLIFIED PROBE TECHNIQUE, MAKING USE OF HIGH THROUGHPUT TECHNOLOGIES AS DESCRIBED BY CMS-2020-01-R: HCPCS

## 2021-04-03 PROCEDURE — U0005 INFEC AGEN DETEC AMPLI PROBE: HCPCS

## 2021-04-04 LAB
SARS-COV-2 RNA RESP QL NAA+PROBE: NOTDETECTED
SPECIMEN SOURCE: NORMAL

## 2021-04-08 ENCOUNTER — ANESTHESIA EVENT (OUTPATIENT)
Dept: SURGERY | Facility: MEDICAL CENTER | Age: 81
End: 2021-04-08
Payer: MEDICARE

## 2021-04-08 ENCOUNTER — HOSPITAL ENCOUNTER (OUTPATIENT)
Facility: MEDICAL CENTER | Age: 81
End: 2021-04-12
Attending: ORTHOPAEDIC SURGERY | Admitting: ORTHOPAEDIC SURGERY
Payer: MEDICARE

## 2021-04-08 ENCOUNTER — ANESTHESIA (OUTPATIENT)
Dept: SURGERY | Facility: MEDICAL CENTER | Age: 81
End: 2021-04-08
Payer: MEDICARE

## 2021-04-08 DIAGNOSIS — G89.18 ACUTE POST-OPERATIVE PAIN: ICD-10-CM

## 2021-04-08 DIAGNOSIS — Z96.649 HISTORY OF HIP REPLACEMENT, UNSPECIFIED LATERALITY: ICD-10-CM

## 2021-04-08 DIAGNOSIS — M17.11 PRIMARY OSTEOARTHRITIS OF RIGHT KNEE: ICD-10-CM

## 2021-04-08 LAB
ABO + RH BLD: NORMAL
ABO GROUP BLD: NORMAL
BLD GP AB SCN SERPL QL: NORMAL
GLUCOSE BLD-MCNC: 129 MG/DL (ref 65–99)
RH BLD: NORMAL

## 2021-04-08 PROCEDURE — 86850 RBC ANTIBODY SCREEN: CPT

## 2021-04-08 PROCEDURE — 160035 HCHG PACU - 1ST 60 MINS PHASE I: Performed by: ORTHOPAEDIC SURGERY

## 2021-04-08 PROCEDURE — 86900 BLOOD TYPING SEROLOGIC ABO: CPT

## 2021-04-08 PROCEDURE — G0378 HOSPITAL OBSERVATION PER HR: HCPCS

## 2021-04-08 PROCEDURE — A9270 NON-COVERED ITEM OR SERVICE: HCPCS | Performed by: ORTHOPAEDIC SURGERY

## 2021-04-08 PROCEDURE — 700111 HCHG RX REV CODE 636 W/ 250 OVERRIDE (IP): Performed by: ANESTHESIOLOGY

## 2021-04-08 PROCEDURE — 501838 HCHG SUTURE GENERAL: Performed by: ORTHOPAEDIC SURGERY

## 2021-04-08 PROCEDURE — 700101 HCHG RX REV CODE 250: Performed by: ORTHOPAEDIC SURGERY

## 2021-04-08 PROCEDURE — 700111 HCHG RX REV CODE 636 W/ 250 OVERRIDE (IP): Performed by: ORTHOPAEDIC SURGERY

## 2021-04-08 PROCEDURE — C1776 JOINT DEVICE (IMPLANTABLE): HCPCS | Performed by: ORTHOPAEDIC SURGERY

## 2021-04-08 PROCEDURE — 502578 HCHG PACK, TOTAL HIP: Performed by: ORTHOPAEDIC SURGERY

## 2021-04-08 PROCEDURE — 82962 GLUCOSE BLOOD TEST: CPT

## 2021-04-08 PROCEDURE — 96365 THER/PROPH/DIAG IV INF INIT: CPT | Mod: XU

## 2021-04-08 PROCEDURE — 86901 BLOOD TYPING SEROLOGIC RH(D): CPT

## 2021-04-08 PROCEDURE — A9270 NON-COVERED ITEM OR SERVICE: HCPCS | Performed by: ANESTHESIOLOGY

## 2021-04-08 PROCEDURE — 160002 HCHG RECOVERY MINUTES (STAT): Performed by: ORTHOPAEDIC SURGERY

## 2021-04-08 PROCEDURE — 160048 HCHG OR STATISTICAL LEVEL 1-5: Performed by: ORTHOPAEDIC SURGERY

## 2021-04-08 PROCEDURE — 700101 HCHG RX REV CODE 250: Performed by: ANESTHESIOLOGY

## 2021-04-08 PROCEDURE — 700102 HCHG RX REV CODE 250 W/ 637 OVERRIDE(OP): Performed by: ORTHOPAEDIC SURGERY

## 2021-04-08 PROCEDURE — 502000 HCHG MISC OR IMPLANTS RC 0278: Performed by: ORTHOPAEDIC SURGERY

## 2021-04-08 PROCEDURE — 96375 TX/PRO/DX INJ NEW DRUG ADDON: CPT | Mod: XU

## 2021-04-08 PROCEDURE — 500367 HCHG DRAIN KIT, HEMOVAC: Performed by: ORTHOPAEDIC SURGERY

## 2021-04-08 PROCEDURE — 160031 HCHG SURGERY MINUTES - 1ST 30 MINS LEVEL 5: Performed by: ORTHOPAEDIC SURGERY

## 2021-04-08 PROCEDURE — 160009 HCHG ANES TIME/MIN: Performed by: ORTHOPAEDIC SURGERY

## 2021-04-08 PROCEDURE — 160042 HCHG SURGERY MINUTES - EA ADDL 1 MIN LEVEL 5: Performed by: ORTHOPAEDIC SURGERY

## 2021-04-08 PROCEDURE — 700102 HCHG RX REV CODE 250 W/ 637 OVERRIDE(OP): Performed by: ANESTHESIOLOGY

## 2021-04-08 PROCEDURE — C1713 ANCHOR/SCREW BN/BN,TIS/BN: HCPCS | Performed by: ORTHOPAEDIC SURGERY

## 2021-04-08 PROCEDURE — 500002 HCHG ADHESIVE, DERMABOND: Performed by: ORTHOPAEDIC SURGERY

## 2021-04-08 PROCEDURE — 700105 HCHG RX REV CODE 258: Performed by: ORTHOPAEDIC SURGERY

## 2021-04-08 PROCEDURE — 160036 HCHG PACU - EA ADDL 30 MINS PHASE I: Performed by: ORTHOPAEDIC SURGERY

## 2021-04-08 RX ORDER — ACETAMINOPHEN 10 MG/ML
1000 INJECTION, SOLUTION INTRAVENOUS EVERY 6 HOURS
Status: COMPLETED | OUTPATIENT
Start: 2021-04-08 | End: 2021-04-09

## 2021-04-08 RX ORDER — NEOSTIGMINE METHYLSULFATE 1 MG/ML
INJECTION, SOLUTION INTRAVENOUS PRN
Status: DISCONTINUED | OUTPATIENT
Start: 2021-04-08 | End: 2021-04-08 | Stop reason: SURG

## 2021-04-08 RX ORDER — POLYETHYLENE GLYCOL 3350 17 G/17G
1 POWDER, FOR SOLUTION ORAL 2 TIMES DAILY PRN
Status: DISCONTINUED | OUTPATIENT
Start: 2021-04-08 | End: 2021-04-12 | Stop reason: HOSPADM

## 2021-04-08 RX ORDER — DIPHENHYDRAMINE HYDROCHLORIDE 50 MG/ML
12.5 INJECTION INTRAMUSCULAR; INTRAVENOUS
Status: DISCONTINUED | OUTPATIENT
Start: 2021-04-08 | End: 2021-04-08 | Stop reason: HOSPADM

## 2021-04-08 RX ORDER — VANCOMYCIN HYDROCHLORIDE 1 G/20ML
INJECTION, POWDER, LYOPHILIZED, FOR SOLUTION INTRAVENOUS
Status: COMPLETED | OUTPATIENT
Start: 2021-04-08 | End: 2021-04-08

## 2021-04-08 RX ORDER — LABETALOL HYDROCHLORIDE 5 MG/ML
INJECTION, SOLUTION INTRAVENOUS PRN
Status: DISCONTINUED | OUTPATIENT
Start: 2021-04-08 | End: 2021-04-08 | Stop reason: SURG

## 2021-04-08 RX ORDER — AMOXICILLIN 250 MG
1 CAPSULE ORAL NIGHTLY
Status: DISCONTINUED | OUTPATIENT
Start: 2021-04-08 | End: 2021-04-12 | Stop reason: HOSPADM

## 2021-04-08 RX ORDER — MORPHINE SULFATE 0.5 MG/ML
INJECTION, SOLUTION EPIDURAL; INTRATHECAL; INTRAVENOUS
Status: DISCONTINUED | OUTPATIENT
Start: 2021-04-08 | End: 2021-04-08 | Stop reason: HOSPADM

## 2021-04-08 RX ORDER — MORPHINE SULFATE 4 MG/ML
4 INJECTION, SOLUTION INTRAMUSCULAR; INTRAVENOUS
Status: DISCONTINUED | OUTPATIENT
Start: 2021-04-08 | End: 2021-04-12 | Stop reason: HOSPADM

## 2021-04-08 RX ORDER — BUPIVACAINE HYDROCHLORIDE 2.5 MG/ML
INJECTION, SOLUTION EPIDURAL; INFILTRATION; INTRACAUDAL
Status: DISCONTINUED | OUTPATIENT
Start: 2021-04-08 | End: 2021-04-08 | Stop reason: HOSPADM

## 2021-04-08 RX ORDER — CEFAZOLIN SODIUM 1 G/3ML
INJECTION, POWDER, FOR SOLUTION INTRAMUSCULAR; INTRAVENOUS PRN
Status: DISCONTINUED | OUTPATIENT
Start: 2021-04-08 | End: 2021-04-08 | Stop reason: SURG

## 2021-04-08 RX ORDER — OXYCODONE HCL 5 MG/5 ML
5 SOLUTION, ORAL ORAL
Status: COMPLETED | OUTPATIENT
Start: 2021-04-08 | End: 2021-04-08

## 2021-04-08 RX ORDER — DIPHENHYDRAMINE HYDROCHLORIDE 50 MG/ML
25 INJECTION INTRAMUSCULAR; INTRAVENOUS EVERY 6 HOURS PRN
Status: DISCONTINUED | OUTPATIENT
Start: 2021-04-08 | End: 2021-04-12 | Stop reason: HOSPADM

## 2021-04-08 RX ORDER — SCOLOPAMINE TRANSDERMAL SYSTEM 1 MG/1
1 PATCH, EXTENDED RELEASE TRANSDERMAL
Status: DISCONTINUED | OUTPATIENT
Start: 2021-04-08 | End: 2021-04-12 | Stop reason: HOSPADM

## 2021-04-08 RX ORDER — HYDROMORPHONE HYDROCHLORIDE 1 MG/ML
0.2 INJECTION, SOLUTION INTRAMUSCULAR; INTRAVENOUS; SUBCUTANEOUS
Status: DISCONTINUED | OUTPATIENT
Start: 2021-04-08 | End: 2021-04-08 | Stop reason: HOSPADM

## 2021-04-08 RX ORDER — CEFAZOLIN SODIUM 2 G/100ML
2 INJECTION, SOLUTION INTRAVENOUS
Status: DISCONTINUED | OUTPATIENT
Start: 2021-04-08 | End: 2021-04-08 | Stop reason: HOSPADM

## 2021-04-08 RX ORDER — ONDANSETRON 2 MG/ML
INJECTION INTRAMUSCULAR; INTRAVENOUS PRN
Status: DISCONTINUED | OUTPATIENT
Start: 2021-04-08 | End: 2021-04-08 | Stop reason: SURG

## 2021-04-08 RX ORDER — SODIUM CHLORIDE 9 MG/ML
INJECTION, SOLUTION INTRAMUSCULAR; INTRAVENOUS; SUBCUTANEOUS
Status: DISCONTINUED | OUTPATIENT
Start: 2021-04-08 | End: 2021-04-08 | Stop reason: HOSPADM

## 2021-04-08 RX ORDER — MEPERIDINE HYDROCHLORIDE 25 MG/ML
6.25 INJECTION INTRAMUSCULAR; INTRAVENOUS; SUBCUTANEOUS
Status: DISCONTINUED | OUTPATIENT
Start: 2021-04-08 | End: 2021-04-08 | Stop reason: HOSPADM

## 2021-04-08 RX ORDER — HALOPERIDOL 5 MG/ML
1 INJECTION INTRAMUSCULAR
Status: DISCONTINUED | OUTPATIENT
Start: 2021-04-08 | End: 2021-04-08 | Stop reason: HOSPADM

## 2021-04-08 RX ORDER — OXYCODONE HYDROCHLORIDE 5 MG/1
5 TABLET ORAL EVERY 6 HOURS PRN
COMMUNITY
Start: 2021-03-31 | End: 2022-01-31

## 2021-04-08 RX ORDER — TRANEXAMIC ACID 100 MG/ML
INJECTION, SOLUTION INTRAVENOUS PRN
Status: DISCONTINUED | OUTPATIENT
Start: 2021-04-08 | End: 2021-04-08 | Stop reason: SURG

## 2021-04-08 RX ORDER — DEXAMETHASONE SODIUM PHOSPHATE 4 MG/ML
4 INJECTION, SOLUTION INTRA-ARTICULAR; INTRALESIONAL; INTRAMUSCULAR; INTRAVENOUS; SOFT TISSUE
Status: DISCONTINUED | OUTPATIENT
Start: 2021-04-08 | End: 2021-04-12 | Stop reason: HOSPADM

## 2021-04-08 RX ORDER — ACETAMINOPHEN 500 MG
1000 TABLET ORAL EVERY 6 HOURS
Status: DISCONTINUED | OUTPATIENT
Start: 2021-04-09 | End: 2021-04-12 | Stop reason: HOSPADM

## 2021-04-08 RX ORDER — GLYCOPYRROLATE 0.2 MG/ML
INJECTION INTRAMUSCULAR; INTRAVENOUS PRN
Status: DISCONTINUED | OUTPATIENT
Start: 2021-04-08 | End: 2021-04-08 | Stop reason: SURG

## 2021-04-08 RX ORDER — OXYCODONE HYDROCHLORIDE 10 MG/1
10 TABLET ORAL
Status: DISCONTINUED | OUTPATIENT
Start: 2021-04-08 | End: 2021-04-12 | Stop reason: HOSPADM

## 2021-04-08 RX ORDER — IBUPROFEN 800 MG/1
800 TABLET ORAL 3 TIMES DAILY PRN
Status: DISCONTINUED | OUTPATIENT
Start: 2021-04-11 | End: 2021-04-12 | Stop reason: HOSPADM

## 2021-04-08 RX ORDER — KETOROLAC TROMETHAMINE 30 MG/ML
INJECTION, SOLUTION INTRAMUSCULAR; INTRAVENOUS
Status: DISCONTINUED | OUTPATIENT
Start: 2021-04-08 | End: 2021-04-08 | Stop reason: HOSPADM

## 2021-04-08 RX ORDER — DOCUSATE SODIUM 100 MG/1
100 CAPSULE, LIQUID FILLED ORAL 2 TIMES DAILY
Status: DISCONTINUED | OUTPATIENT
Start: 2021-04-08 | End: 2021-04-12 | Stop reason: HOSPADM

## 2021-04-08 RX ORDER — SUCCINYLCHOLINE/SOD CL,ISO/PF 200MG/10ML
SYRINGE (ML) INTRAVENOUS PRN
Status: DISCONTINUED | OUTPATIENT
Start: 2021-04-08 | End: 2021-04-08 | Stop reason: SURG

## 2021-04-08 RX ORDER — ENEMA 19; 7 G/133ML; G/133ML
1 ENEMA RECTAL
Status: DISCONTINUED | OUTPATIENT
Start: 2021-04-08 | End: 2021-04-12 | Stop reason: HOSPADM

## 2021-04-08 RX ORDER — DEXAMETHASONE SODIUM PHOSPHATE 4 MG/ML
INJECTION, SOLUTION INTRA-ARTICULAR; INTRALESIONAL; INTRAMUSCULAR; INTRAVENOUS; SOFT TISSUE PRN
Status: DISCONTINUED | OUTPATIENT
Start: 2021-04-08 | End: 2021-04-08 | Stop reason: SURG

## 2021-04-08 RX ORDER — HYDROMORPHONE HYDROCHLORIDE 2 MG/ML
INJECTION, SOLUTION INTRAMUSCULAR; INTRAVENOUS; SUBCUTANEOUS PRN
Status: DISCONTINUED | OUTPATIENT
Start: 2021-04-08 | End: 2021-04-08 | Stop reason: SURG

## 2021-04-08 RX ORDER — SODIUM CHLORIDE, SODIUM LACTATE, POTASSIUM CHLORIDE, CALCIUM CHLORIDE 600; 310; 30; 20 MG/100ML; MG/100ML; MG/100ML; MG/100ML
INJECTION, SOLUTION INTRAVENOUS CONTINUOUS
Status: ACTIVE | OUTPATIENT
Start: 2021-04-08 | End: 2021-04-08

## 2021-04-08 RX ORDER — HYDROMORPHONE HYDROCHLORIDE 1 MG/ML
0.4 INJECTION, SOLUTION INTRAMUSCULAR; INTRAVENOUS; SUBCUTANEOUS
Status: DISCONTINUED | OUTPATIENT
Start: 2021-04-08 | End: 2021-04-08 | Stop reason: HOSPADM

## 2021-04-08 RX ORDER — AMOXICILLIN 250 MG
1 CAPSULE ORAL
Status: DISCONTINUED | OUTPATIENT
Start: 2021-04-08 | End: 2021-04-12 | Stop reason: HOSPADM

## 2021-04-08 RX ORDER — HYDROMORPHONE HYDROCHLORIDE 1 MG/ML
0.1 INJECTION, SOLUTION INTRAMUSCULAR; INTRAVENOUS; SUBCUTANEOUS
Status: DISCONTINUED | OUTPATIENT
Start: 2021-04-08 | End: 2021-04-08 | Stop reason: HOSPADM

## 2021-04-08 RX ORDER — ACETAMINOPHEN 10 MG/ML
1 INJECTION, SOLUTION INTRAVENOUS
Status: COMPLETED | OUTPATIENT
Start: 2021-04-08 | End: 2021-04-08

## 2021-04-08 RX ORDER — ACETAMINOPHEN 500 MG
1000 TABLET ORAL EVERY 6 HOURS PRN
Status: DISCONTINUED | OUTPATIENT
Start: 2021-04-13 | End: 2021-04-12 | Stop reason: HOSPADM

## 2021-04-08 RX ORDER — KETOROLAC TROMETHAMINE 30 MG/ML
15 INJECTION, SOLUTION INTRAMUSCULAR; INTRAVENOUS EVERY 6 HOURS
Status: COMPLETED | OUTPATIENT
Start: 2021-04-08 | End: 2021-04-11

## 2021-04-08 RX ORDER — ONDANSETRON 2 MG/ML
4 INJECTION INTRAMUSCULAR; INTRAVENOUS
Status: DISCONTINUED | OUTPATIENT
Start: 2021-04-08 | End: 2021-04-08 | Stop reason: HOSPADM

## 2021-04-08 RX ORDER — HALOPERIDOL 5 MG/ML
1 INJECTION INTRAMUSCULAR EVERY 6 HOURS PRN
Status: DISCONTINUED | OUTPATIENT
Start: 2021-04-08 | End: 2021-04-12 | Stop reason: HOSPADM

## 2021-04-08 RX ORDER — ONDANSETRON 2 MG/ML
4 INJECTION INTRAMUSCULAR; INTRAVENOUS EVERY 4 HOURS PRN
Status: DISCONTINUED | OUTPATIENT
Start: 2021-04-08 | End: 2021-04-12 | Stop reason: HOSPADM

## 2021-04-08 RX ORDER — OXYCODONE HYDROCHLORIDE 5 MG/1
5 TABLET ORAL
Status: DISCONTINUED | OUTPATIENT
Start: 2021-04-08 | End: 2021-04-12 | Stop reason: HOSPADM

## 2021-04-08 RX ORDER — OXYCODONE HCL 5 MG/5 ML
10 SOLUTION, ORAL ORAL
Status: COMPLETED | OUTPATIENT
Start: 2021-04-08 | End: 2021-04-08

## 2021-04-08 RX ORDER — BISACODYL 10 MG
10 SUPPOSITORY, RECTAL RECTAL
Status: DISCONTINUED | OUTPATIENT
Start: 2021-04-08 | End: 2021-04-12 | Stop reason: HOSPADM

## 2021-04-08 RX ORDER — LABETALOL HYDROCHLORIDE 5 MG/ML
5 INJECTION, SOLUTION INTRAVENOUS
Status: DISCONTINUED | OUTPATIENT
Start: 2021-04-08 | End: 2021-04-08 | Stop reason: HOSPADM

## 2021-04-08 RX ORDER — CEFAZOLIN SODIUM 2 G/100ML
2 INJECTION, SOLUTION INTRAVENOUS ONCE
Status: COMPLETED | OUTPATIENT
Start: 2021-04-08 | End: 2021-04-08

## 2021-04-08 RX ADMIN — LABETALOL HYDROCHLORIDE 5 MG: 5 INJECTION, SOLUTION INTRAVENOUS at 12:08

## 2021-04-08 RX ADMIN — DEXAMETHASONE SODIUM PHOSPHATE 4 MG: 4 INJECTION, SOLUTION INTRA-ARTICULAR; INTRALESIONAL; INTRAMUSCULAR; INTRAVENOUS; SOFT TISSUE at 10:33

## 2021-04-08 RX ADMIN — ROCURONIUM BROMIDE 30 MG: 10 INJECTION, SOLUTION INTRAVENOUS at 10:12

## 2021-04-08 RX ADMIN — ROCURONIUM BROMIDE 10 MG: 10 INJECTION, SOLUTION INTRAVENOUS at 10:57

## 2021-04-08 RX ADMIN — FENTANYL CITRATE 25 MCG: 50 INJECTION, SOLUTION INTRAMUSCULAR; INTRAVENOUS at 14:04

## 2021-04-08 RX ADMIN — KETOROLAC TROMETHAMINE 15 MG: 30 INJECTION, SOLUTION INTRAMUSCULAR; INTRAVENOUS at 18:01

## 2021-04-08 RX ADMIN — LABETALOL HYDROCHLORIDE 10 MG: 5 INJECTION, SOLUTION INTRAVENOUS at 11:47

## 2021-04-08 RX ADMIN — OXYCODONE HYDROCHLORIDE 5 MG: 5 SOLUTION ORAL at 12:12

## 2021-04-08 RX ADMIN — ACETAMINOPHEN 1 G: 10 INJECTION, SOLUTION INTRAVENOUS at 08:50

## 2021-04-08 RX ADMIN — SODIUM CHLORIDE, POTASSIUM CHLORIDE, SODIUM LACTATE AND CALCIUM CHLORIDE: 600; 310; 30; 20 INJECTION, SOLUTION INTRAVENOUS at 08:49

## 2021-04-08 RX ADMIN — GLYCOPYRROLATE 0.8 MG: 0.2 INJECTION INTRAMUSCULAR; INTRAVENOUS at 11:35

## 2021-04-08 RX ADMIN — PROPOFOL 100 MG: 10 INJECTION, EMULSION INTRAVENOUS at 10:12

## 2021-04-08 RX ADMIN — HYDROMORPHONE HYDROCHLORIDE 1 MG: 2 INJECTION, SOLUTION INTRAMUSCULAR; INTRAVENOUS; SUBCUTANEOUS at 10:10

## 2021-04-08 RX ADMIN — CEFAZOLIN 2 G: 330 INJECTION, POWDER, FOR SOLUTION INTRAMUSCULAR; INTRAVENOUS at 10:19

## 2021-04-08 RX ADMIN — TRANEXAMIC ACID 1000 MG: 100 INJECTION, SOLUTION INTRAVENOUS at 10:22

## 2021-04-08 RX ADMIN — CEFAZOLIN SODIUM 2 G: 2 INJECTION, SOLUTION INTRAVENOUS at 15:57

## 2021-04-08 RX ADMIN — ACETAMINOPHEN 1000 MG: 10 INJECTION, SOLUTION INTRAVENOUS at 18:04

## 2021-04-08 RX ADMIN — LABETALOL HYDROCHLORIDE 10 MG: 5 INJECTION, SOLUTION INTRAVENOUS at 10:41

## 2021-04-08 RX ADMIN — OXYCODONE 5 MG: 5 TABLET ORAL at 20:40

## 2021-04-08 RX ADMIN — POVIDONE IODINE 15 ML: 100 SOLUTION TOPICAL at 08:49

## 2021-04-08 RX ADMIN — DOCUSATE SODIUM 100 MG: 100 CAPSULE, LIQUID FILLED ORAL at 18:00

## 2021-04-08 RX ADMIN — Medication 100 MG: at 10:57

## 2021-04-08 RX ADMIN — ALBUTEROL SULFATE 2.5 MG: 2.5 SOLUTION RESPIRATORY (INHALATION) at 12:37

## 2021-04-08 RX ADMIN — ONDANSETRON 4 MG: 2 INJECTION INTRAMUSCULAR; INTRAVENOUS at 10:33

## 2021-04-08 RX ADMIN — NEOSTIGMINE METHYLSULFATE 4 MG: 1 INJECTION INTRAVENOUS at 11:35

## 2021-04-08 ASSESSMENT — COGNITIVE AND FUNCTIONAL STATUS - GENERAL
EATING MEALS: A LITTLE
CLIMB 3 TO 5 STEPS WITH RAILING: A LOT
DRESSING REGULAR LOWER BODY CLOTHING: A LOT
MOVING FROM LYING ON BACK TO SITTING ON SIDE OF FLAT BED: A LOT
PERSONAL GROOMING: A LITTLE
DAILY ACTIVITIY SCORE: 15
TOILETING: A LOT
WALKING IN HOSPITAL ROOM: A LOT
HELP NEEDED FOR BATHING: A LOT
DRESSING REGULAR UPPER BODY CLOTHING: A LITTLE
MOVING TO AND FROM BED TO CHAIR: A LOT
MOBILITY SCORE: 12
STANDING UP FROM CHAIR USING ARMS: A LOT
SUGGESTED CMS G CODE MODIFIER DAILY ACTIVITY: CK
SUGGESTED CMS G CODE MODIFIER MOBILITY: CL
TURNING FROM BACK TO SIDE WHILE IN FLAT BAD: A LOT

## 2021-04-08 ASSESSMENT — PAIN DESCRIPTION - PAIN TYPE
TYPE: SURGICAL PAIN
TYPE: ACUTE PAIN;SURGICAL PAIN
TYPE: SURGICAL PAIN
TYPE: ACUTE PAIN;SURGICAL PAIN

## 2021-04-08 ASSESSMENT — LIFESTYLE VARIABLES
TOTAL SCORE: 0
TOTAL SCORE: 0
AVERAGE NUMBER OF DAYS PER WEEK YOU HAVE A DRINK CONTAINING ALCOHOL: 0
ALCOHOL_USE: NO
HAVE YOU EVER FELT YOU SHOULD CUT DOWN ON YOUR DRINKING: NO
HAVE PEOPLE ANNOYED YOU BY CRITICIZING YOUR DRINKING: NO
HOW MANY TIMES IN THE PAST YEAR HAVE YOU HAD 5 OR MORE DRINKS IN A DAY: 0
EVER HAD A DRINK FIRST THING IN THE MORNING TO STEADY YOUR NERVES TO GET RID OF A HANGOVER: NO
CONSUMPTION TOTAL: NEGATIVE
EVER FELT BAD OR GUILTY ABOUT YOUR DRINKING: NO
ON A TYPICAL DAY WHEN YOU DRINK ALCOHOL HOW MANY DRINKS DO YOU HAVE: 0
TOTAL SCORE: 0

## 2021-04-08 ASSESSMENT — PATIENT HEALTH QUESTIONNAIRE - PHQ9
2. FEELING DOWN, DEPRESSED, IRRITABLE, OR HOPELESS: NOT AT ALL
SUM OF ALL RESPONSES TO PHQ9 QUESTIONS 1 AND 2: 0
1. LITTLE INTEREST OR PLEASURE IN DOING THINGS: NOT AT ALL

## 2021-04-08 ASSESSMENT — PAIN SCALES - GENERAL: PAIN_LEVEL: 4

## 2021-04-08 NOTE — PROGRESS NOTES
2 RN Skin Check    2 RN skin check completed with July Gabriel RN.  Devices in place: Oxymask, SCD's  Skin assessed under devices: Yes  Confirmed pressure ulcers found on: N/A  New potential pressure ulcers noted on: N/A  Wound consult placed N/A  The following noted: surgical dressing in place on left hip; no drainage noted.    Interventions in place: pillows in use for support and comfort; patient turns self.

## 2021-04-08 NOTE — ANESTHESIA PROCEDURE NOTES
Airway    Date/Time: 4/8/2021 10:14 AM  Performed by: Bassam Parra M.D.  Authorized by: Bassam Parra M.D.     Location:  OR  Urgency:  Elective  Indications for Airway Management:  Anesthesia      Spontaneous Ventilation: absent    Sedation Level:  Deep  Preoxygenated: Yes    Patient Position:  Sniffing  Mask Difficulty Assessment:  1 - vent by mask  Final Airway Type:  Endotracheal airway  Final Endotracheal Airway:  ETT  Cuffed: Yes    Technique Used for Successful ETT Placement:  Video laryngoscopy    Insertion Site:  Oral  Blade Type:  Emilio  Laryngoscope Blade/Videolaryngoscope Blade Size:  3  ETT Size (mm):  6.5  Measured from:  Teeth  ETT to Teeth (cm):  18  Placement Verified by: auscultation and capnometry    Cormack-Lehane Classification:  Grade I - full view of glottis  Number of Attempts at Approach:  1

## 2021-04-08 NOTE — OR NURSING
1158: Pt arrived from OR, handoff received from anesthesiologist and RN. Dressing to left hip with aquacel AG C/D/I. L pedal pulse 2+, skin warm.     1212: Pt medicated with oral pain meds per MD orders.     1245: Call made to patient's son to update patient status.     1352: Handoff to [facility] on T3. Transport requested. O2 tank level above half full, pt transported on 10 L 02. Tubing connected to device and to patient and stored safely in [place]ier prior to transport.

## 2021-04-08 NOTE — OP REPORT
DATE OF SERVICE:  04/08/2021     PREOPERATIVE DIAGNOSIS:  Degenerative arthritis of the hip.     POSTOPERATIVE DIAGNOSIS:  Degenerative arthritis of the hip.     OPERATION PERFORMED:  Left total hip arthroplasty.     SURGEON:  Sánchez Ortez MD     ASSISTANT:  Yadi Sepulveda CFA     INDICATIONS FOR THE OPERATION:  Pain, failed conservative management, positive   imaging studies, probable hip dysplasia.     COMPONENTS UTILIZED:  A 48 cup, 36 liner, a 6x132 standard Accolade II, a 36   -2.5 Biolox neck and 30 and 35 cup screws.     COMPLICATIONS:  None.     BLOOD LOSS:  200 mL.     MEDICATIONS UTILIZED:  Ancef, vancomycin powder and hemoblast 15 grams local   essence injection.     APPROACH:  Anterolateral.     POSITION: Lateral with Stulberg pelvic stabilizers.     DESCRIPTION OF PROCEDURE:  The patient brought to the operating room awake,   alert, placed on the operating room table in lateral position.  She had leg   length inequality of about half inch.  The patient was then rolled lateral,   routine for total hip arthroplasty. Left hip was then shaved, scrubbed,   prepped and draped in normal sterile routine fashion.     Timeout, spacesuits were utilized, the doors marked arthroplasty case.    Timeout, appropriate extremity identified, medications given.     Straight incision over the trochanter, subcutaneous tissue dissected down to   the tensor, which was opened and then an anterolateral approach to the hip was   utilized.  A self-retaining retractor was placed on the wing of the ilium and   then the capsule was T'd open, tagged with #1 Vicryl and then the hip was   dislocated.     I dissected soft tissue off the lesser trochanter and then I made a femoral   neck cut a little longer to make up some leg length.  The femoral neck cut was   then made.   Retractors were placed giving easy access to the cup.    Circumferential excision of the labrum and cotyloid fossa and then 20 degrees   of forward flexion, 45  degrees of abduction, the cup was reamed to 48, trialed   48 press fit 3-hole cup with two screws of 30 and 35 mm length.  We now   tapped in the polyethylene liner. Good stability.     At this point, our attention was turned to the femoral side.   and   then a T-handled starter reamer and then progressively reamed to a #6 Accolade   stem, which we tapped into position.  We had multiple trials, eventually   ended up with a -2.5 for nearly equal leg lengths.     Now, we simply exchanged the trial stem for the permanent stem, tapped it into   position, copiously irrigated and dried the Regalado taper and then assembled   our -2.5 mm articular bearing surface.  We checked the cup, irrigated the cup,   reduced the hip and basically the operation was completed.     At this point, we began the closure.  The capsule was closed with #1 Vicryl.    We brought the abductor back into its anatomic position on the trochanteric   ridge with locking #5 Ti-Cron through drill holes on the trochanter as well as   into the vastus lateralis and the tip of the trochanter.  We then reinforced   it with #1 Vicryl, all along the suture line.     At this point, we applied our hemoblast and then waited 2 minutes while it   activated and then closed the tensor with #1 Vicryl 2-0 subcuticular stitch, a   silver dressing and then the patient was taken to the recovery room in stable   condition.  No intraoperative or immediate postoperative complications.  The   patient tolerated the procedure well.        ______________________________  MD BRENNA PEDERSEN/MERYL/CARLTON    DD:  04/08/2021 11:46  DT:  04/08/2021 12:29    Job#:  [id number]

## 2021-04-08 NOTE — OR SURGEON
Immediate Post OP Note    PreOp Diagnosis: OA hip    PostOp Diagnosis: same    Procedure(s):  ARTHROPLASTY, HIP, TOTAL - Wound Class: Clean    Surgeon(s):  Sánchez Ortez M.D.    Anesthesiologist/Type of Anesthesia:  Anesthesiologist: Bassam Parra M.D./General    Surgical Staff:  Circulator: Praveena Tilley R.N.; Lukas D. Gansert, R.N.  Scrub Person: Rajesh Zaman  First Assist: Yadi Sepulveda    Specimens removed if any:  * No specimens in log *    Estimated Blood Loss: 125ccs    Findings: as described    Complications: none        4/8/2021 11:51 AM Sánchez Ortez M.D.

## 2021-04-08 NOTE — ANESTHESIA POSTPROCEDURE EVALUATION
Patient: Pamela Monaco    Procedure Summary     Date: 04/08/21 Room / Location: [place] [address] / SURGERY [facility]    Anesthesia Start: 1005 Anesthesia Stop: 1202    Procedure: ARTHROPLASTY, HIP, TOTAL (Left Hip) Diagnosis: (UNILATERAL PRIMARY OA LEFT HIPPAIN IN LEFT HIP)    Surgeons: Sánchez Ortez M.D. Responsible Provider: aBssam Parra M.D.    Anesthesia Type: general ASA Status: 2          Final Anesthesia Type: general  Last vitals  BP   Blood Pressure : 136/60    Temp   36.1 °C (97 °F)    Pulse   88   Resp   16    SpO2   90 %      Anesthesia Post Evaluation    Patient location during evaluation: PACU  Patient participation: complete - patient participated  Level of consciousness: awake and alert  Pain score: 4    Airway patency: patent  Anesthetic complications: no  Cardiovascular status: hemodynamically stable  Respiratory status: acceptable  Hydration status: euvolemic    PONV: none          No complications documented.

## 2021-04-08 NOTE — ANESTHESIA PREPROCEDURE EVALUATION
Relevant Problems   No relevant active problems       Physical Exam    Airway   Mallampati: II  TM distance: >3 FB  Neck ROM: full       Cardiovascular - normal exam  Rhythm: regular  Rate: normal  (-) murmur     Dental - normal exam           Pulmonary - normal exam  Breath sounds clear to auscultation     Abdominal    Neurological - normal exam                 Anesthesia Plan    ASA 2       Plan - general       Airway plan will be ETT                    Informed Consent:

## 2021-04-08 NOTE — ANESTHESIA POSTPROCEDURE EVALUATION
Patient: Pamela Monaco    Procedure Summary     Date: 04/08/21 Room / Location: [place] [address] / SURGERY [facility]    Anesthesia Start: 1005 Anesthesia Stop: 1202    Procedure: ARTHROPLASTY, HIP, TOTAL (Left Hip) Diagnosis: (UNILATERAL PRIMARY OA LEFT HIPPAIN IN LEFT HIP)    Surgeons: Sánchez Ortez M.D. Responsible Provider: Bassam Parra M.D.    Anesthesia Type: general ASA Status: 2          Final Anesthesia Type: general  Last vitals  BP   Blood Pressure : 136/60    Temp   36.1 °C (97 °F)    Pulse   88   Resp   16    SpO2   90 %      Anesthesia Post Evaluation    Patient location during evaluation: PACU  Patient participation: complete - patient participated  Level of consciousness: awake and alert  Pain score: 4    Airway patency: patent  Anesthetic complications: no  Cardiovascular status: hemodynamically stable  Respiratory status: acceptable  Hydration status: euvolemic    PONV: none          No complications documented.

## 2021-04-08 NOTE — ANESTHESIA TIME REPORT
Anesthesia Start and Stop Event Times     Date Time Event    4/8/2021 0938 Ready for Procedure     1005 Anesthesia Start     1202 Anesthesia Stop        Responsible Staff  04/08/21    Name Role Begin End    Bassam Parra M.D. Anesth 1005 1202        Preop Diagnosis (Free Text):  Pre-op Diagnosis     UNILATERAL PRIMARY OA LEFT HIP  PAIN IN LEFT HIP        Preop Diagnosis (Codes):    Post op Diagnosis  Pain in left hip      Premium Reason  Non-Premium    Comments:

## 2021-04-09 PROCEDURE — G0378 HOSPITAL OBSERVATION PER HR: HCPCS

## 2021-04-09 PROCEDURE — 700111 HCHG RX REV CODE 636 W/ 250 OVERRIDE (IP): Performed by: ORTHOPAEDIC SURGERY

## 2021-04-09 PROCEDURE — 700102 HCHG RX REV CODE 250 W/ 637 OVERRIDE(OP): Performed by: ORTHOPAEDIC SURGERY

## 2021-04-09 PROCEDURE — 96375 TX/PRO/DX INJ NEW DRUG ADDON: CPT

## 2021-04-09 PROCEDURE — 96376 TX/PRO/DX INJ SAME DRUG ADON: CPT

## 2021-04-09 PROCEDURE — 97162 PT EVAL MOD COMPLEX 30 MIN: CPT

## 2021-04-09 PROCEDURE — A9270 NON-COVERED ITEM OR SERVICE: HCPCS | Performed by: ORTHOPAEDIC SURGERY

## 2021-04-09 PROCEDURE — 97166 OT EVAL MOD COMPLEX 45 MIN: CPT

## 2021-04-09 RX ORDER — OXYCODONE HYDROCHLORIDE AND ACETAMINOPHEN 5; 325 MG/1; MG/1
1 TABLET ORAL EVERY 8 HOURS PRN
Qty: 30 TABLET | Refills: 0 | Status: SHIPPED | OUTPATIENT
Start: 2021-04-09 | End: 2021-04-24

## 2021-04-09 RX ORDER — OXYCODONE HYDROCHLORIDE AND ACETAMINOPHEN 5; 325 MG/1; MG/1
1 TABLET ORAL EVERY 4 HOURS PRN
Qty: 40 TABLET | Refills: 0 | Status: CANCELLED | OUTPATIENT
Start: 2021-04-09 | End: 2021-04-24

## 2021-04-09 RX ORDER — DOCUSATE SODIUM 100 MG/1
200 CAPSULE, LIQUID FILLED ORAL 2 TIMES DAILY
Qty: 60 CAPSULE | Refills: 0 | Status: SHIPPED | OUTPATIENT
Start: 2021-04-09 | End: 2021-12-09 | Stop reason: CLARIF

## 2021-04-09 RX ADMIN — ACETAMINOPHEN 1000 MG: 500 TABLET ORAL at 23:10

## 2021-04-09 RX ADMIN — DOCUSATE SODIUM 100 MG: 100 CAPSULE, LIQUID FILLED ORAL at 06:21

## 2021-04-09 RX ADMIN — KETOROLAC TROMETHAMINE 15 MG: 30 INJECTION, SOLUTION INTRAMUSCULAR; INTRAVENOUS at 13:12

## 2021-04-09 RX ADMIN — ACETAMINOPHEN 1000 MG: 500 TABLET ORAL at 16:34

## 2021-04-09 RX ADMIN — OXYCODONE 5 MG: 5 TABLET ORAL at 16:34

## 2021-04-09 RX ADMIN — KETOROLAC TROMETHAMINE 15 MG: 30 INJECTION, SOLUTION INTRAMUSCULAR; INTRAVENOUS at 00:06

## 2021-04-09 RX ADMIN — KETOROLAC TROMETHAMINE 15 MG: 30 INJECTION, SOLUTION INTRAMUSCULAR; INTRAVENOUS at 16:35

## 2021-04-09 RX ADMIN — KETOROLAC TROMETHAMINE 15 MG: 30 INJECTION, SOLUTION INTRAMUSCULAR; INTRAVENOUS at 23:10

## 2021-04-09 RX ADMIN — KETOROLAC TROMETHAMINE 15 MG: 30 INJECTION, SOLUTION INTRAMUSCULAR; INTRAVENOUS at 06:21

## 2021-04-09 RX ADMIN — ASPIRIN 81 MG: 81 TABLET, COATED ORAL at 16:34

## 2021-04-09 RX ADMIN — ASPIRIN 81 MG: 81 TABLET, COATED ORAL at 06:21

## 2021-04-09 RX ADMIN — MORPHINE SULFATE 4 MG: 4 INJECTION INTRAVENOUS at 03:35

## 2021-04-09 RX ADMIN — ACETAMINOPHEN 1000 MG: 10 INJECTION, SOLUTION INTRAVENOUS at 00:06

## 2021-04-09 RX ADMIN — OXYCODONE 5 MG: 5 TABLET ORAL at 09:36

## 2021-04-09 RX ADMIN — OXYCODONE HYDROCHLORIDE 10 MG: 10 TABLET ORAL at 01:30

## 2021-04-09 RX ADMIN — MORPHINE SULFATE 4 MG: 4 INJECTION INTRAVENOUS at 21:16

## 2021-04-09 RX ADMIN — DOCUSATE SODIUM 100 MG: 100 CAPSULE, LIQUID FILLED ORAL at 16:34

## 2021-04-09 RX ADMIN — ACETAMINOPHEN 1000 MG: 500 TABLET ORAL at 06:20

## 2021-04-09 RX ADMIN — OXYCODONE 5 MG: 5 TABLET ORAL at 20:09

## 2021-04-09 ASSESSMENT — COGNITIVE AND FUNCTIONAL STATUS - GENERAL
MOBILITY SCORE: 13
EATING MEALS: A LITTLE
TOILETING: A LOT
MOVING TO AND FROM BED TO CHAIR: UNABLE
PERSONAL GROOMING: A LITTLE
DAILY ACTIVITIY SCORE: 15
DRESSING REGULAR UPPER BODY CLOTHING: A LITTLE
SUGGESTED CMS G CODE MODIFIER MOBILITY: CL
HELP NEEDED FOR BATHING: A LOT
TURNING FROM BACK TO SIDE WHILE IN FLAT BAD: A LOT
SUGGESTED CMS G CODE MODIFIER DAILY ACTIVITY: CK
CLIMB 3 TO 5 STEPS WITH RAILING: TOTAL
WALKING IN HOSPITAL ROOM: A LITTLE
MOVING FROM LYING ON BACK TO SITTING ON SIDE OF FLAT BED: A LITTLE
DRESSING REGULAR LOWER BODY CLOTHING: A LOT
STANDING UP FROM CHAIR USING ARMS: A LITTLE

## 2021-04-09 ASSESSMENT — GAIT ASSESSMENTS
DISTANCE (FEET): 30
ASSISTIVE DEVICE: FRONT WHEEL WALKER
GAIT LEVEL OF ASSIST: MINIMAL ASSIST

## 2021-04-09 ASSESSMENT — PAIN DESCRIPTION - PAIN TYPE
TYPE: ACUTE PAIN;SURGICAL PAIN

## 2021-04-09 ASSESSMENT — ACTIVITIES OF DAILY LIVING (ADL): TOILETING: INDEPENDENT

## 2021-04-09 NOTE — THERAPY
"Occupational Therapy   Initial Evaluation     Patient Name: Pamela Monaco  Age:  81 y.o., Sex:  female  Medical Record #: [id number]  Today's Date: 4/9/2021     Precautions  Precautions: (P) Fall Risk, Anterior Hip Precautions, Partial Weight Bearing Left Lower Extremity (See Comments for Percentage)  Comments: (P) 50% LLE    Assessment  Patient is 81 y.o. female admitted for elective CORETTA, anterior approach with 50% WBing LLE. Pt lives in a H with son, has 24/7 assistance at home from family, pt had limited command following despite interpretation used, required frequent verbal cues for task sequencing and initiation, will continue to see for skilled therapy as well as recommend home health.    Plan    Recommend Occupational Therapy 3 times per week until therapy goals are met for the following treatments:  Adaptive Equipment, Self Care/Activities of Daily Living, Therapeutic Activities and Therapeutic Exercises.    DC Equipment Recommendations: (P) Other (Comments)(hip kit)  Discharge Recommendations: (P) Recommend home health for continued occupational therapy services     Subjective    \"Thank you, thank you!\"     Objective       04/09/21 1400   Prior Living Situation   Prior Services Continuous (24 Hour) Care Giving Per Service   Housing / Facility 1 Section House   Bathroom Set up Bathtub / Shower Combination;Grab Bars;Shower Chair   Equipment Owned 4-Wheel Walker;Wheelchair;Grab Bar(s) In Tub / Shower;Tub / Shower Seat;Bed Side Commode   Lives with - Patient's Self Care Capacity Adult Children   Comments has 24/7 assistance from family per EMR   Prior Level of ADL Function   Self Feeding Independent   Grooming / Hygiene Independent   Bathing Independent   Dressing Independent   Toileting Independent   Prior Level of IADL Function   Medication Management Requires Assist   Laundry Requires Assist   Kitchen Mobility Requires Assist   Finances Requires Assist   Home Management Requires Assist   Shopping " Requires Assist   Prior Level Of Mobility Independent With Device in Home;Uses Wheel Chair for Community Mobility   Driving / Transportation Relatives / Others Provide Transportation   Precautions   Precautions Fall Risk;Anterior Hip Precautions;Partial Weight Bearing Left Lower Extremity (See Comments for Percentage)   Comments 50% LLE   Pain 0 - 10 Group   Therapist Pain Assessment Post Activity Pain Same as Prior to Activity;Nurse Notified;0   Non Verbal Descriptors   Non Verbal Scale  Calm   Cognition    Cognition / Consciousness X   Ability To Follow Commands 1 Step   New Learning Impaired   Attention Impaired   Comments limited command following   Active ROM Upper Body   Active ROM Upper Body  WDL   Dominant Hand Right   Strength Upper Body   Upper Body Strength  WDL   Sensation Upper Body   Upper Extremity Sensation  WDL   Upper Body Muscle Tone   Upper Body Muscle Tone  WDL   Neurological Concerns   Neurological Concerns No   Coordination Upper Body   Coordination WDL   Balance Assessment   Sitting Balance (Static) Fair   Sitting Balance (Dynamic) Fair -   Standing Balance (Static) Poor +   Standing Balance (Dynamic) Poor +   Weight Shift Sitting Fair   Weight Shift Standing Poor   Comments w/ FWW   Bed Mobility    Supine to Sit Minimal Assist   Scooting Minimal Assist   ADL Assessment   Grooming Supervision;Seated   Upper Body Dressing Supervision   Lower Body Dressing Minimal Assist  (used adaptive equipment)   Toileting Total Assist   How much help from another person does the patient currently need...   Putting on and taking off regular lower body clothing? 2   Bathing (including washing, rinsing, and drying)? 2   Toileting, which includes using a toilet, bedpan, or urinal? 2   Putting on and taking off regular upper body clothing? 3   Taking care of personal grooming such as brushing teeth? 3   Eating meals? 3   6 Clicks Daily Activity Score 15   Functional Mobility   Sit to Stand Minimal Assist   Bed,  Chair, Wheelchair Transfer Minimal Assist   Toilet Transfers Refused   Transfer Method Stand Step   Mobility bed mobility, in room mobility, up to chair   Comments w/ FWW   Visual Perception   Visual Perception  WDL   Activity Tolerance   Sitting in Chair left seated in chair   Sitting Edge of Bed 10   Standing 10   Patient / Family Goals   Patient / Family Goal #1 To go home   Short Term Goals   Short Term Goal # 1 Pt will complete ADL transfers with supervision   Short Term Goal # 2 Pt will complete LB dressing with supervision   Short Term Goal # 3 Pt will complete toileting with supervision   Education Group   Education Provided Role of Occupational Therapist;Hip Precautions   Role of Occupational Therapist Patient Response Patient;Acceptance;Explanation   Hip Precautions Patient Response Patient;Acceptance;Explanation;Demonstration;Action Demonstration   Problem List   Problem List Decreased Active Daily Living Skills;Decreased Homemaking Skills;Decreased Functional Mobility;Decreased Activity Tolerance;Limited Knowledge of Post Op Precautions   Interdisciplinary Plan of Care Collaboration   IDT Collaboration with  Nursing;Physical Therapist   Patient Position at End of Therapy Seated;Chair Alarm On;Call Light within Reach;Tray Table within Reach;Phone within Reach;Family / Friend in Room   Collaboration Comments RN updated

## 2021-04-09 NOTE — DISCHARGE SUMMARY
DISCHARGE SUMMARY    PATIENTS NAME: Pamela Monaco    MRN: [id number]    CSN: [id number]    ADMIT DATE:  4/8/2021    DISCHARGE DATE: 4/9/2021    ADMIT MD: Sánchez Ortez M.D.    DISCHARGE MD: Sánchez Ortez M.D.    REASON FOR ADMIT:failed conservative management of left hip pain    PRINCIPLE DIAGNOSIS:Degenerative arthritis of the hip.    SECONDARY DIAGNOSIS: none    PROCEDURES: 4/8/21  Sánchez Ortez M.D. Left total hip arthroplasty    CONSULTATIONS: Sánchez Ortez M.D.     HOSPITAL COURSE: Patient is a 81 year old female with long standing left hip pain. She has been followed for this issue by Dr Sánchez Ortez MD.  She has failled all conservative management to date.  For that reason Dr Ortez felt that surgical intervention was necessitated .  After explaining the indications, risks, benefits, and alternatives the patient wished to proceed with surgical intervention.  The patient was taken to the OR for the above mentioned procedure.  He had no complications and minimal blood loss. She has done well with mobilization and her pain has been well controlled with oral medications. She is now ready for DC at this time.     DISCHARGE LOCATION:home with family    DVT PROPHYLAXSIS:ambulatory    ANTIBIOTICS:perioperative completed    WEIGHT BEARING:weight bearing as tolerated     FOLLOW UP: 10-14 days post operatively with Dr Sánchez Ortez M.D.    DISCHARGE DIAGNOSIS:status post left total hip arthroplasty    MEDICATIONS:   Current Outpatient Medications   Medication Sig Dispense Refill   • oxyCODONE-acetaminophen (PERCOCET) 5-325 MG Tab Take 1 tablet by mouth every 8 hours as needed for up to 15 days. 30 tablet 0   • docusate sodium (COLACE) 100 MG Cap Take 2 Capsules by mouth 2 times a day. 60 capsule 0

## 2021-04-09 NOTE — THERAPY
Physical Therapy   Initial Evaluation     Patient Name: Pamela Monaco  Age:  81 y.o., Sex:  female  Medical Record #: [id number]  Today's Date: 4/9/2021     Precautions: Fall Risk, Anterior Hip Precautions, Partial Weight Bearing Left Lower Extremity (See Comments for Percentage)    Assessment  Patient is 81 y.o. female s/p L CORETTA presenting to PT most limited by pain and possibly cognition. Pt was easily distracted and required extensive cues to attend to task at hand. Will have to review LE exercises next visit for this reason. Pt required min A to exit bed, but once upright, sustained at SBA. Pt initiated sit to stand with min A and was able to complete x30' of gait with FWW before reporting pain to be too great to continue. Anticipate pt to progress such that she may be able to return home with family support and HHPT. Will see pt again to address gait deviations and poor activity tolerance.       Plan    Recommend Physical Therapy 4 times per week until therapy goals are met for the following treatments:  Bed Mobility, Equipment, Gait Training, Neuro Re-Education / Balance, Self Care/Home Evaluation, Sensory Integration Techniques, Stair Training, Therapeutic Activities and Therapeutic Exercises    DC Equipment Recommendations: Unable to determine at this time  Discharge Recommendations: Recommend home health for continued physical therapy services        Objective       04/09/21 1126   Prior Living Situation   Prior Services Continuous (24 Hour) Care Giving Family   Housing / Facility 1 Story House   Steps Into Home 1   Equipment Owned Wheelchair;Front-Wheel Walker   Lives with - Patient's Self Care Capacity Adult Children   Comments chart indicates pt has 24hr supervision. Pt's son present who reports he and his brother care for pt.    Prior Level of Functional Mobility   Bed Mobility Independent   Transfer Status Independent   Ambulation Independent   Distance Ambulation (Feet)   (household)   Assistive  Devices Used Single Point Cane   Balance Assessment   Comments seated at SBA; standing at min A with FWW   Gait Analysis   Gait Level Of Assist Minimal Assist   Assistive Device Front Wheel Walker   Distance (Feet) 30   Weight Bearing Status 50% WB to L LE   Comments pt iwth limited WB to L LE. Step-to pattern   Bed Mobility    Supine to Sit Minimal Assist  (for L LE and trunk to upright)   Scooting Minimal Assist   Functional Mobility   Sit to Stand Minimal Assist   Bed, Chair, Wheelchair Transfer Minimal Assist   Transfer Method Stand Step   Short Term Goals    Short Term Goal # 1 Pt will perform bed mob at spv in 6tx to improve functional independence.    Short Term Goal # 2 Pt will perform transfers with FWW at spv in 6tx to improve functional mobility.    Short Term Goal # 3 Pt will perform gait x50' with FWW at spv in 6tx ot improve functional mobility.    Anticipated Discharge Equipment and Recommendations   DC Equipment Recommendations Unable to determine at this time   Discharge Recommendations Recommend home health for continued physical therapy services

## 2021-04-09 NOTE — PROGRESS NOTES
Ortho  Patient seen and examined  Ready for home today  PT working with patient, pain well controlled  Wound dry NV intact foow  Ortez

## 2021-04-09 NOTE — CARE PLAN
Problem: Communication  Goal: The ability to communicate needs accurately and effectively will improve  Outcome: PROGRESSING AS EXPECTED  Note: Patient updated on POC and encouraged to express any needs or concerns.  services being utilized to communicate with patient regarding medications, assessment, and any additional questions/concerns.       Problem: Pain Management  Goal: Pain level will decrease to patient's comfort goal  Outcome: PROGRESSING AS EXPECTED  Note: Taught pt 0-10 pain scale and non-pharmacologic method of pain mgt, encouraged to verbalize when in pain. Repositioned and administered PRN pain med as needed.

## 2021-04-09 NOTE — CARE PLAN
Problem: Communication  Goal: The ability to communicate needs accurately and effectively will improve  Outcome: PROGRESSING AS EXPECTED  Note: Patient updated on POC and encouraged to express any needs or concerns. Interpretation services utilized to educate patient about medications and assessment.      Problem: Safety  Goal: Will remain free from falls  Outcome: PROGRESSING AS EXPECTED  Note: Hourly rounding. Non-skid socks. Bed locked & in low position. Personal belongings and call light within reach.      Problem: Pain Management  Goal: Pain level will decrease to patient's comfort goal  Outcome: PROGRESSING AS EXPECTED  Note: Taught pt 0-10 pain scale and non-pharmacologic method of pain mgt, encouraged to verbalize when in pain. Administered PRN pain med as needed.

## 2021-04-10 PROBLEM — Z96.649 HISTORY OF HIP REPLACEMENT: Status: ACTIVE | Noted: 2021-04-10

## 2021-04-10 PROCEDURE — 700111 HCHG RX REV CODE 636 W/ 250 OVERRIDE (IP): Performed by: ORTHOPAEDIC SURGERY

## 2021-04-10 PROCEDURE — 97535 SELF CARE MNGMENT TRAINING: CPT | Mod: XU

## 2021-04-10 PROCEDURE — G0378 HOSPITAL OBSERVATION PER HR: HCPCS

## 2021-04-10 PROCEDURE — A9270 NON-COVERED ITEM OR SERVICE: HCPCS | Performed by: ORTHOPAEDIC SURGERY

## 2021-04-10 PROCEDURE — 97530 THERAPEUTIC ACTIVITIES: CPT

## 2021-04-10 PROCEDURE — 700102 HCHG RX REV CODE 250 W/ 637 OVERRIDE(OP): Performed by: ORTHOPAEDIC SURGERY

## 2021-04-10 PROCEDURE — 96376 TX/PRO/DX INJ SAME DRUG ADON: CPT

## 2021-04-10 RX ADMIN — ACETAMINOPHEN 1000 MG: 500 TABLET ORAL at 12:12

## 2021-04-10 RX ADMIN — ASPIRIN 81 MG: 81 TABLET, COATED ORAL at 17:27

## 2021-04-10 RX ADMIN — DOCUSATE SODIUM 100 MG: 100 CAPSULE, LIQUID FILLED ORAL at 17:27

## 2021-04-10 RX ADMIN — ACETAMINOPHEN 1000 MG: 500 TABLET ORAL at 05:16

## 2021-04-10 RX ADMIN — KETOROLAC TROMETHAMINE 15 MG: 30 INJECTION, SOLUTION INTRAMUSCULAR; INTRAVENOUS at 12:12

## 2021-04-10 RX ADMIN — OXYCODONE 5 MG: 5 TABLET ORAL at 12:12

## 2021-04-10 RX ADMIN — ACETAMINOPHEN 1000 MG: 500 TABLET ORAL at 17:27

## 2021-04-10 RX ADMIN — KETOROLAC TROMETHAMINE 15 MG: 30 INJECTION, SOLUTION INTRAMUSCULAR; INTRAVENOUS at 05:16

## 2021-04-10 RX ADMIN — DOCUSATE SODIUM 100 MG: 100 CAPSULE, LIQUID FILLED ORAL at 05:16

## 2021-04-10 RX ADMIN — KETOROLAC TROMETHAMINE 15 MG: 30 INJECTION, SOLUTION INTRAMUSCULAR; INTRAVENOUS at 23:06

## 2021-04-10 RX ADMIN — ASPIRIN 81 MG: 81 TABLET, COATED ORAL at 05:16

## 2021-04-10 RX ADMIN — KETOROLAC TROMETHAMINE 15 MG: 30 INJECTION, SOLUTION INTRAMUSCULAR; INTRAVENOUS at 17:27

## 2021-04-10 RX ADMIN — OXYCODONE 5 MG: 5 TABLET ORAL at 18:12

## 2021-04-10 RX ADMIN — OXYCODONE 5 MG: 5 TABLET ORAL at 02:01

## 2021-04-10 ASSESSMENT — COGNITIVE AND FUNCTIONAL STATUS - GENERAL
STANDING UP FROM CHAIR USING ARMS: A LITTLE
DRESSING REGULAR UPPER BODY CLOTHING: A LITTLE
MOBILITY SCORE: 17
TURNING FROM BACK TO SIDE WHILE IN FLAT BAD: A LITTLE
SUGGESTED CMS G CODE MODIFIER MOBILITY: CK
DRESSING REGULAR LOWER BODY CLOTHING: A LITTLE
CLIMB 3 TO 5 STEPS WITH RAILING: A LOT
MOVING TO AND FROM BED TO CHAIR: A LITTLE
WALKING IN HOSPITAL ROOM: A LITTLE
DAILY ACTIVITIY SCORE: 20
MOVING FROM LYING ON BACK TO SITTING ON SIDE OF FLAT BED: A LITTLE
SUGGESTED CMS G CODE MODIFIER DAILY ACTIVITY: CJ
TOILETING: A LITTLE
HELP NEEDED FOR BATHING: A LITTLE

## 2021-04-10 ASSESSMENT — PAIN DESCRIPTION - PAIN TYPE
TYPE: SURGICAL PAIN

## 2021-04-10 ASSESSMENT — GAIT ASSESSMENTS
DISTANCE (FEET): 50
DISTANCE (FEET): 50
ASSISTIVE DEVICE: FRONT WHEEL WALKER

## 2021-04-10 NOTE — THERAPY
Physical Therapy   Daily Treatment     Patient Name: Pamela Monaco  Age:  81 y.o., Sex:  female  Medical Record #: [id number]  Today's Date: 4/10/2021     Precautions: Anterior Hip Precautions, Partial Weight Bearing Left Lower Extremity (See Comments for Percentage)    Assessment    Pt has progressed her mobility and today was able to complete x50' of gait with FWW at SBA for safety. She did report pain with movement, especially during seated knee flexion/extension and during gait. Daughter present and largely supportive. Pt appeared more alert today as well. Both have been educated on importance of ongoing mobility at home as well as elevation of L LE as it appears quite edematous. Should pt remain here, will follow for gait deviations and further education. Anticipate pt to progress such that she can go home with HHPT and continued family support.     Plan    Continue current treatment plan.    DC Equipment Recommendations: None (has FWW at home)  Discharge Recommendations: Recommend home health for continued physical therapy services       Objective     04/10/21 0950   Balance   Comments Seated at spv; standing at SBA   Gait Analysis   Gait Level Of Assist   (Stand By Assist)   Assistive Device Front Wheel Walker   Distance (Feet) 50   Weight Bearing Status 50% to L LE   Comments Step-to pattern; fatigued toward end of gait   Bed Mobility    Supine to Sit   (found up in chair)   Functional Mobility   Sit to Stand   (Stand By Assist; cues for L LE placement)   Bed, Chair, Wheelchair Transfer   (Stand By Assist)   Transfer Method Stand Step   Comments Cued for sequencing sit to stand with L LE anterior to R.    Short Term Goals    Short Term Goal # 1 Pt will perform bed mob at spv in 6tx to improve functional independence.    Goal Outcome # 1 goal not met   Short Term Goal # 2 Pt will perform transfers with FWW at spv in 6tx to improve functional mobility.    Goal Outcome # 2 Goal not met   Short Term Goal #  3 Pt will perform gait x50' with FWW at spv in 6tx ot improve functional mobility.    Goal Outcome # 3 Goal not met   Anticipated Discharge Equipment and Recommendations   DC Equipment Recommendations None   Discharge Recommendations Recommend home health for continued physical therapy services

## 2021-04-10 NOTE — FACE TO FACE
Face to Face Supporting Documentation - Home Health    The encounter with this patient was in whole or in part the primary reason for home health admission.    Date of encounter:   Patient:                    MRN:                       YOB: 2019  Pamela Monaco  [id number]  1940     Home health to see patient for:  Skilled Nursing care for assessment, interventions & education, Physical Therapy evaluation and treatment and Occupational therapy evaluation and treatment    Skilled need for:  Surgical Aftercare S/P CORETTA    Skilled nursing interventions to include:  Wound Care    Homebound status evidenced by:  Need the aid of supportive devices such as crutches, canes, wheelchairs or walkers or Needs the assistance of another person in order to leave the home. Leaving home requires a considerable and taxing effort. There is a normal inability to leave the home.    Community Physician to provide follow up care: Argelia George M.D.     Optional Interventions? No      I certify the face to face encounter for this home health care referral meets the CMS requirements and the encounter/clinical assessment with the patient was, in whole, or in part, for the medical condition(s) listed above, which is the primary reason for home health care. Based on my clinical findings: the service(s) are medically necessary, support the need for home health care, and the homebound criteria are met.  I certify that this patient has had a face to face encounter by myself.  Sebas Nguyen P.A.-C. - NPI: [id number]

## 2021-04-10 NOTE — PROGRESS NOTES
"4/10/2021    S: Doing well overall. Awaiting DC w [facility]    Physical Exam  Vitals  /43   Pulse 85   Temp 36.8 °C (98.2 °F) (Temporal)   Resp 16   Ht 1.549 m (5' 1\")   Wt 64.5 kg (142 lb 3.2 oz)   SpO2 94%   General: A&Ox3, NAD  Chest: Symmetric expansion of the chest wall, no distress.  Heart: RRR, palpable peripheral pulses  Extremities:   LLE   Dressing is c/d/I, no strikethrough or obvious active drainage   Compartments soft and compressible   EHL/FHL/GSC/TA intact   SILT DP/SP/Andriy/Saph/Tib   DP/PT 2+, Brisk cap refill    Laboratory Values      No results for input(s): SODIUM, POTASSIUM, CHLORIDE, CO2, GLUCOSE, BUN, CPKTOTAL in the last 72 hours.        Impression:  Patient is a 81 y.o. F s/p L CORETTA on 4/8    Plan:  Pain control  Weight bearing: WBAT  DVT Ppx: ASA  Abx: Completed  PT/OT: Ambulation  DC Planning: Home w [facility]    DO [facility]  Trauma Fellow      "

## 2021-04-10 NOTE — THERAPY
Occupational Therapy  Daily Treatment     Patient Name: Pamela Monaco  Age:  81 y.o., Sex:  female  Medical Record #: [id number]  Today's Date: 4/10/2021     Precautions  Precautions: (P) Anterior Hip Precautions, Partial Weight Bearing Left Lower Extremity (See Comments for Percentage)  Comments: (P) 50% L LE    Assessment    Improved participation, command following and cognition noted during today's session. Increase independence with toileting, transfers, and basic ADl tasks. Daughter present throughout, educated regarding needs for home regarding assist level during functional task completion. Will benefit from continued OT intervention to focus on ADLS, transfers, activity tolerance, AE    Plan    Continue current treatment plan.    DC Equipment Recommendations: Other (Comments)(hip kit)  Discharge Recommendations: (P) Recommend home health for continued occupational therapy services    Subjective    Pleasant and cooperative throughout     Objective       04/10/21 1230   Total Time Spent   Total Time Spent (Mins) 40   Treatment Charges   Charges Yes   OT Self Care / ADL 2   OT Therapy Activity 1   Precautions   Precautions Anterior Hip Precautions;Partial Weight Bearing Left Lower Extremity (See Comments for Percentage)   Comments 50% L LE   Vitals   O2 Delivery Device None - Room Air   Pain 0 - 10 Group   Therapist Pain Assessment Nurse Notified;During Activity;4   Cognition    Ability To Follow Commands 2 Step   Comments improved alertness, participation noted today.    Passive ROM Upper Body   Passive ROM Upper Body WDL   Active ROM Upper Body   Active ROM Upper Body  WDL   Dominant Hand Right   Strength Upper Body   Upper Body Strength  WDL   Sensation Upper Body   Upper Extremity Sensation  WDL   Upper Body Muscle Tone   Upper Body Muscle Tone  WDL   Balance   Sitting Balance (Static) Good   Sitting Balance (Dynamic) Fair +   Standing Balance (Static) Fair +   Standing Balance (Dynamic) Fair   Weight  Shift Sitting Good   Weight Shift Standing Fair   Skilled Intervention Verbal Cuing   Bed Mobility    Supine to Sit   (found seated in bedside chair)   Sit to Supine   (returned to seated in bedside chair)   Scooting Minimal Assist   Activities of Daily Living   Eating Supervision   Grooming Supervision;Standing   Bathing   (NT)   Upper Body Dressing Supervision   Toileting Minimal Assist  (CG for balance during standing hygiene)   Skilled Intervention Compensatory Strategies;Verbal Cuing;Tactile Cuing   How much help from another person does the patient currently need...   Putting on and taking off regular lower body clothing? 3   Bathing (including washing, rinsing, and drying)? 3   Toileting, which includes using a toilet, bedpan, or urinal? 3   Putting on and taking off regular upper body clothing? 3   Taking care of personal grooming such as brushing teeth? 4   Eating meals? 4   6 Clicks Daily Activity Score 20   Functional Mobility   Sit to Stand   (SBA)   Bed, Chair, Wheelchair Transfer   (SBA)   Toilet Transfers   (SBA)   Transfer Method Stand Step   Distance (Feet) 50   # of Times Distance was Traveled 1   Skilled Intervention Verbal Cuing;Compensatory Strategies   Visual Perception   Visual Perception  WDL   Patient / Family Goals   Patient / Family Goal #1 To go home   Goal #1 Outcome Progressing as expected   Short Term Goals   Short Term Goal # 1 Pt will complete ADL transfers with supervision   Goal Outcome # 1 Progressing as expected   Short Term Goal # 2 Pt will complete LB dressing with supervision   Goal Outcome # 2 Progressing as expected   Short Term Goal # 3 Pt will complete toileting with supervision   Goal Outcome # 3 Progressing as expected   Education Group   Role of Occupational Therapist Patient Response Patient;Family;Acceptance;Explanation;Demonstration;Verbal Demonstration;Action Demonstration   Anticipated Discharge Equipment and Recommendations   Discharge Recommendations Recommend  home health for continued occupational therapy services   Interdisciplinary Plan of Care Collaboration   IDT Collaboration with  Nursing;Family / Caregiver;Other (See Comments)  ( services)   Patient Position at End of Therapy Seated;Call Light within Reach;Tray Table within Reach;Phone within Reach;Family / Friend in Room   Collaboration Comments report given   Session Information   Date / Session Number  4/10,2 ( 2/3,4/15)   Priority 3

## 2021-04-10 NOTE — PROGRESS NOTES
Patient cleared for DC Saturday as long as cleared by therapy team  DC orders and Rx completed and in chart

## 2021-04-11 PROCEDURE — 97116 GAIT TRAINING THERAPY: CPT

## 2021-04-11 PROCEDURE — 700102 HCHG RX REV CODE 250 W/ 637 OVERRIDE(OP): Performed by: ORTHOPAEDIC SURGERY

## 2021-04-11 PROCEDURE — 96376 TX/PRO/DX INJ SAME DRUG ADON: CPT

## 2021-04-11 PROCEDURE — 97535 SELF CARE MNGMENT TRAINING: CPT | Mod: XU

## 2021-04-11 PROCEDURE — 700111 HCHG RX REV CODE 636 W/ 250 OVERRIDE (IP): Performed by: ORTHOPAEDIC SURGERY

## 2021-04-11 PROCEDURE — 97530 THERAPEUTIC ACTIVITIES: CPT

## 2021-04-11 PROCEDURE — G0378 HOSPITAL OBSERVATION PER HR: HCPCS

## 2021-04-11 PROCEDURE — A9270 NON-COVERED ITEM OR SERVICE: HCPCS | Performed by: ORTHOPAEDIC SURGERY

## 2021-04-11 RX ADMIN — DOCUSATE SODIUM 100 MG: 100 CAPSULE, LIQUID FILLED ORAL at 17:08

## 2021-04-11 RX ADMIN — ACETAMINOPHEN 1000 MG: 500 TABLET ORAL at 17:08

## 2021-04-11 RX ADMIN — KETOROLAC TROMETHAMINE 15 MG: 30 INJECTION, SOLUTION INTRAMUSCULAR; INTRAVENOUS at 05:16

## 2021-04-11 RX ADMIN — DOCUSATE SODIUM 50 MG AND SENNOSIDES 8.6 MG 1 TABLET: 8.6; 5 TABLET, FILM COATED ORAL at 21:09

## 2021-04-11 RX ADMIN — OXYCODONE 5 MG: 5 TABLET ORAL at 23:47

## 2021-04-11 RX ADMIN — ACETAMINOPHEN 1000 MG: 500 TABLET ORAL at 23:34

## 2021-04-11 RX ADMIN — ACETAMINOPHEN 1000 MG: 500 TABLET ORAL at 05:16

## 2021-04-11 RX ADMIN — ASPIRIN 81 MG: 81 TABLET, COATED ORAL at 05:16

## 2021-04-11 RX ADMIN — ASPIRIN 81 MG: 81 TABLET, COATED ORAL at 17:09

## 2021-04-11 RX ADMIN — KETOROLAC TROMETHAMINE 15 MG: 30 INJECTION, SOLUTION INTRAMUSCULAR; INTRAVENOUS at 11:59

## 2021-04-11 RX ADMIN — OXYCODONE 5 MG: 5 TABLET ORAL at 05:16

## 2021-04-11 RX ADMIN — ACETAMINOPHEN 1000 MG: 500 TABLET ORAL at 11:59

## 2021-04-11 ASSESSMENT — GAIT ASSESSMENTS
GAIT LEVEL OF ASSIST: SUPERVISED
ASSISTIVE DEVICE: FRONT WHEEL WALKER
DEVIATION: ANTALGIC;STEP TO
DISTANCE (FEET): 100

## 2021-04-11 ASSESSMENT — COGNITIVE AND FUNCTIONAL STATUS - GENERAL
CLIMB 3 TO 5 STEPS WITH RAILING: A LITTLE
WALKING IN HOSPITAL ROOM: A LITTLE
TURNING FROM BACK TO SIDE WHILE IN FLAT BAD: A LITTLE
MOBILITY SCORE: 18
STANDING UP FROM CHAIR USING ARMS: A LITTLE
MOVING FROM LYING ON BACK TO SITTING ON SIDE OF FLAT BED: A LITTLE
SUGGESTED CMS G CODE MODIFIER MOBILITY: CK
MOVING TO AND FROM BED TO CHAIR: A LITTLE

## 2021-04-11 ASSESSMENT — PAIN DESCRIPTION - PAIN TYPE: TYPE: SURGICAL PAIN

## 2021-04-11 NOTE — CARE PLAN
Problem: Communication  Goal: The ability to communicate needs accurately and effectively will improve  Outcome: PROGRESSING AS EXPECTED  Note: Ipad  used to communicate with pt. Pt encouraged to verbalize needs/concerns. Pt updated on plan of care.     Problem: Pain Management  Goal: Pain level will decrease to patient's comfort goal  Outcome: PROGRESSING AS EXPECTED  Note: Pt educated about non-pharmacological pain control interventions. Pain medication given per MAR. Pt's pain well controlled with current pain medication regimen.     Problem: Mobility  Goal: Risk for activity intolerance will decrease  Outcome: PROGRESSING AS EXPECTED

## 2021-04-11 NOTE — PROGRESS NOTES
"   Orthopaedic Progress Note    Interval changes:  Patient awaiting home health auth   Likely DC home tomorrow  Pain improving    ROS - Patient denies any new issues.  Pain well controlled.    /72   Pulse 74   Temp 36.3 °C (97.4 °F) (Temporal)   Resp 16   Ht 1.549 m (5' 1\")   Wt 64.5 kg (142 lb 3.2 oz)   SpO2 95%       Patient seen and examined  No acute distress  Breathing non labored  RRR  Left hip Surgical dressing is clean, dry, and intact. Patient clearly fires tibialis anterior, EHL, and gastrocnemius/soleus. Sensation is intact to light touch throughout superficial peroneal, deep peroneal, tibial, saphenous, and sural nerve distributions. Strong and palpable 2+ dorsalis pedis and posterior tibial pulses with capillary refill less than 2 seconds. No lower leg tenderness or discomfort.         Active Hospital Problems    Diagnosis    • History of hip replacement [Z96.649]        Assessment/Plan:  Likely DC home tomorrow once HH set up  POD#3 S/P Left total hip arthroplasty  Wt bearing status - WBAT with anterior hip precautions  Wound care/Drains - dressing left in place  Future Procedures - none planned   Sutures/Staples out- 10-14 days post operatively  PT/OT-initiated  Antibiotics: peroperative completed  DVT Prophylaxis- TEDS/SCDs/Foot pumps  Shultz-none  Case Coordination for Discharge Planning - Disposition home    "

## 2021-04-12 VITALS
WEIGHT: 142.2 LBS | RESPIRATION RATE: 17 BRPM | OXYGEN SATURATION: 97 % | SYSTOLIC BLOOD PRESSURE: 144 MMHG | HEART RATE: 88 BPM | HEIGHT: 61 IN | BODY MASS INDEX: 26.85 KG/M2 | DIASTOLIC BLOOD PRESSURE: 68 MMHG | TEMPERATURE: 98.1 F

## 2021-04-12 PROCEDURE — G0378 HOSPITAL OBSERVATION PER HR: HCPCS

## 2021-04-12 PROCEDURE — A9270 NON-COVERED ITEM OR SERVICE: HCPCS | Performed by: ORTHOPAEDIC SURGERY

## 2021-04-12 PROCEDURE — 700102 HCHG RX REV CODE 250 W/ 637 OVERRIDE(OP): Performed by: ORTHOPAEDIC SURGERY

## 2021-04-12 RX ADMIN — ACETAMINOPHEN 1000 MG: 500 TABLET ORAL at 05:45

## 2021-04-12 RX ADMIN — ACETAMINOPHEN 1000 MG: 500 TABLET ORAL at 12:20

## 2021-04-12 RX ADMIN — ASPIRIN 81 MG: 81 TABLET, COATED ORAL at 05:45

## 2021-04-12 RX ADMIN — DOCUSATE SODIUM 100 MG: 100 CAPSULE, LIQUID FILLED ORAL at 05:45

## 2021-04-12 ASSESSMENT — PATIENT HEALTH QUESTIONNAIRE - PHQ9
SUM OF ALL RESPONSES TO PHQ9 QUESTIONS 1 AND 2: 0
1. LITTLE INTEREST OR PLEASURE IN DOING THINGS: NOT AT ALL
2. FEELING DOWN, DEPRESSED, IRRITABLE, OR HOPELESS: NOT AT ALL

## 2021-04-12 ASSESSMENT — PAIN DESCRIPTION - PAIN TYPE
TYPE: SURGICAL PAIN
TYPE: SURGICAL PAIN

## 2021-04-12 NOTE — DISCHARGE PLANNING
Agency/Facility Name: [facility]  Spoke To: Luigi  Outcome: Luigi stated the office began working on the referral @6172 4/12. This DPA will f/u again soon

## 2021-04-12 NOTE — THERAPY
"Physical Therapy   Daily Treatment     Patient Name: Pamela Monaco  Age:  81 y.o., Sex:  female  Medical Record #: [id number]  Today's Date: 4/11/2021     Precautions: Anterior Hip Precautions, Partial Weight Bearing Left Lower Extremity (See Comments for Percentage)(50%)    Assessment    Patient progressing with functional mobility. She continues to be limited by decreased understanding of partial weight bearing precautions and adherence with stairs. She ambulated approximately 100ft with FWW and min A for safety only; she demonstrated difficulty maintaining 50% WB LLE while ascending/descending 4 stairs. Daughter at bedside reported family will be able to carry patient into home upon DC and daughter verbalized wish for home health PT to address stairs as able and appropriate. Will continue to follow.    Plan    Continue current treatment plan.    DC Equipment Recommendations: None  Discharge Recommendations: Recommend home health for continued physical therapy services      Subjective    \"Thank you so much for your help.\"     Objective       04/11/21 1601   Total Time Spent   Total Time Spent (Mins) 60   Charge Group   Charges  Yes   PT Gait Training 2   PT Therapeutic Activities 1   PT Self Care / Home Evaluation  1   Precautions   Precautions Anterior Hip Precautions;Partial Weight Bearing Left Lower Extremity (See Comments for Percentage)  (50%)   Vitals   O2 (LPM) 0   O2 Delivery Device None - Room Air   Pain 0 - 10 Group   Therapist Pain Assessment   (no pain complaint during session)   Cognition    Cognition / Consciousness X   Ability To Follow Commands 2 Step   New Learning Impaired   Comments pleasant, cooperative, motivated. limited return demo of education   Passive ROM Lower Body   Passive ROM Lower Body X   Comments limited by pain but functional for mobility   Active ROM Lower Body    Active ROM Lower Body  X   Comments as above   Strength Lower Body   Lower Body Strength  X   Comments as above "   Sensation Lower Body   Lower Extremity Sensation   Not Tested   Lower Body Muscle Tone   Lower Body Muscle Tone  WDL   Balance   Sitting Balance (Static) Good   Sitting Balance (Dynamic) Fair +   Standing Balance (Static) Fair   Standing Balance (Dynamic) Fair   Weight Shift Sitting Good   Weight Shift Standing Fair   Skilled Intervention Compensatory Strategies;Sequencing;Tactile Cuing;Verbal Cuing   Comments FWW in standing   Gait Analysis   Gait Level Of Assist Supervised   Assistive Device Front Wheel Walker   Distance (Feet) 100   # of Times Distance was Traveled 1   Deviation Antalgic;Step To   # of Stairs Climbed 4   Level of Assist with Stairs Minimal Assist   Weight Bearing Status 50% LLE   Vision Deficits Impacting Mobility NT   Skilled Intervention Compensatory Strategies;Sequencing;Tactile Cuing;Verbal Cuing   Comments patient with difficulty obtaining leverage from BUE on rail for stairs but ambulation appeared improved from prior session. Patient reported R wrist pain due to RUE use of FWW   Bed Mobility    Supine to Sit Minimal Assist  (for offloading LLE with movement)   Sit to Supine   (NT, left sitting EOB with daughter at bedside)   Scooting Supervised  (seated)   Skilled Intervention Compensatory Strategies;Facilitation;Sequencing;Tactile Cuing;Verbal Cuing   Functional Mobility   Sit to Stand Minimal Assist  (for safety)   Bed, Chair, Wheelchair Transfer Minimal Assist   Transfer Method Stand Step   Skilled Intervention Verbal Cuing   Comments cues for PWB LLE   How much difficulty does the patient currently have...   Turning over in bed (including adjusting bedclothes, sheets and blankets)? 3   Sitting down on and standing up from a chair with arms (e.g., wheelchair, bedside commode, etc.) 3   Moving from lying on back to sitting on the side of the bed? 3   How much help from another person does the patient currently need...   Moving to and from a bed to a chair (including a wheelchair)? 3    Need to walk in a hospital room? 3   Climbing 3-5 steps with a railing? 3   6 clicks Mobility Score 18   Activity Tolerance   Sitting in Chair declined   Sitting Edge of Bed 30 min   Standing 15 min   Comments limited by pain   Patient / Family Goals    Patient / Family Goal #1 go home   Goal #1 Outcome Goal not met   Short Term Goals    Short Term Goal # 1 Pt will perform bed mob at [facility] in 6tx to improve functional independence.    Goal Outcome # 1 goal not met   Short Term Goal # 2 Pt will perform transfers with FWW at [facility] in 6tx to improve functional mobility.    Goal Outcome # 2 Goal not met   Short Term Goal # 3 Pt will perform gait x50' with FWW at [facility] in 6tx ot improve functional mobility.    Goal Outcome # 3 Goal not met   Short Term Goal # 4 Patient will ascend/descend 4 steps with L rail to maintain PWB LLE with min A within 6tx in order to enter/exit home   Goal Outcome # 4 Goal not met   Education Group   Weight Bearing Status Patient Response Patient;Family;Acceptance;Explanation;Demonstration;Verbal Demonstration;Action Demonstration;Reinforcement Needed   Anticipated Discharge Equipment and Recommendations   DC Equipment Recommendations None   Discharge Recommendations Recommend home health for continued physical therapy services   Interdisciplinary Plan of Care Collaboration   IDT Collaboration with  Nursing;Family / Caregiver   Patient Position at End of Therapy Seated;Edge of Bed;Call Light within Reach;Tray Table within Reach;Phone within Reach;Family / Friend in Room   Collaboration Comments RN aware of visit, response   Session Information   Date / Session Number  4/11-3 (3/4, 4/15)   Priority   (.)

## 2021-04-12 NOTE — CARE PLAN
Problem: Safety  Goal: Will remain free from falls  Outcome: PROGRESSING AS EXPECTED  Note: Bed locked and in lowest position. Non-skid footwear in place.      Problem: Infection  Goal: Will remain free from infection  Outcome: PROGRESSING AS EXPECTED  Note: L hip surgical dressing appears clean, dry, and intact.      Problem: Pain Management  Goal: Pain level will decrease to patient's comfort goal  Outcome: PROGRESSING AS EXPECTED  Note: No complaints of pain at this time      Problem: Mobility  Goal: Risk for activity intolerance will decrease  Outcome: PROGRESSING AS EXPECTED  Note: Standby assist with FWW, gait steady

## 2021-04-12 NOTE — DISCHARGE INSTRUCTIONS
Discharge Instructions    Discharged to home by car with relative. Discharged via wheelchair, hospital escort: Yes.  Special equipment needed: Wheelchair    Be sure to schedule a follow-up appointment with your primary care doctor or any specialists as instructed.     Discharge Plan:   Diet Plan: Discussed  Activity Level: Discussed  Confirmed Follow up Appointment: Patient to Call and Schedule Appointment  Confirmed Symptoms Management: Discussed  Medication Reconciliation Updated: Yes    I understand that a diet low in cholesterol, fat, and sodium is recommended for good health. Unless I have been given specific instructions below for another diet, I accept this instruction as my diet prescription.   Other diet: continue regular diet as tolerated.    Special Instructions: None    · Is patient discharged on Warfarin / Coumadin?   No     Depression / Suicide Risk    As you are discharged from this [facility], it is important to learn how to keep safe from harming yourself.    Recognize the warning signs:  · Abrupt changes in personality, positive or negative- including increase in energy   · Giving away possessions  · Change in eating patterns- significant weight changes-  positive or negative  · Change in sleeping patterns- unable to sleep or sleeping all the time   · Unwillingness or inability to communicate  · Depression  · Unusual sadness, discouragement and loneliness  · Talk of wanting to die  · Neglect of personal appearance   · Rebelliousness- reckless behavior  · Withdrawal from people/activities they love  · Confusion- inability to concentrate     If you or a loved one observes any of these behaviors or has concerns about self-harm, here's what you can do:  · Talk about it- your feelings and reasons for harming yourself  · Remove any means that you might use to hurt yourself (examples: pills, rope, extension cords, firearm)  · Get professional help from the community (Mental Health, Substance  Abuse, psychological counseling)  · Do not be alone:Call your Safe Contact- someone whom you trust who will be there for you.  · Call your local CRISIS HOTLINE 796-4965 or 583-064-2394  · Call your local Children's Mobile Crisis Response Team [place] (014) 972-5849 or www.[facility]  · Call the toll free National Suicide Prevention Hotlines   · National Suicide Prevention Lifeline 926-503-HLSC (7394)  · National Hope Line Network 800-SUICIDE (790-1819)

## 2021-04-12 NOTE — PROGRESS NOTES
Problem: Safety  Goal: Will remain free from falls  Outcome: PROGRESSING AS EXPECTED   Safety precautions in place.  Bed alarm on, and chair alarm is on. Call light and personal items within reach. Bed at lowest position and locked.  Siderails up X 2.  Clutter free environment & adequate lighting. Educated on level of risk and reminded to call for assistance.  Hourly rounding in effect.     Problem: Infection  Goal: Will remain free from infection  Outcome: PROGRESSING AS EXPECTED   Afebrile. Standard precautions in effect.  Hand washing every encounter, and before & after patient care.  Verbalized understanding.     Problem: Knowledge Deficit  Goal: Knowledge of disease process/condition, treatment plan, diagnostic tests, and medications will improve  Outcome: PROGRESSING AS EXPECTED   Discussed plan of care, including discharge POC with patient and son.  Questions answered.  Verbalized understanding.

## 2021-04-12 NOTE — PROGRESS NOTES
Mobility Progress Note    Surgery patient: YES  Date of surgery: 4/8/2021  Ambulated 50 ft on day of surgery? (N/A if patient did not undergo surgery today): N/A  Number of times ambulated 50 feet or greater today: 1  Patient has been up to chair, edge of bed or HOB 90 degrees for all meals?: YES  Goal met? (goal is ambulating at least 50 feet 2 times on day shift, one time on night shift): NO  If patient did not meet mobility goal, why?: Patient ambulated 100 feet today, patient steady,  patient tolerates well. Encouraged to continue ambulation and ROM exercises as tolerated.

## 2021-04-13 NOTE — PROGRESS NOTES
Patient being discharged to home. IV discharged.Discussed discharge instructions with patient and son.  All questions answered. Paitent and son agreeable to DC plan. Patient belongings with her. Patient discharge via wheelchair with escort and son.

## 2021-04-21 NOTE — H&P
DATE OF ADMISSION:  04/08/2021     REASON FOR ADMISSION:  Left total hip arthroplasty.     HISTORY OF PRESENT ILLNESS:  This is a very pleasant 81-year-old patient will   be followed for an extended period of time with left hip pain.  She has pain   when she walks, interferes with her activities, keeps her up at night.  She is   now being admitted for left total hip arthroplasty.     ALLERGIES:  SHE HAS PENICILLIN ALLERGIES.     CURRENT MEDICATIONS:  Her current medications are listed in the medical   record.  Orthopedically include tramadol, Celebrex, levothyroxine and aspirin.     PAST MEDICAL HISTORY:  The remainder of her past medical history is   essentially noncontributory orthopedically.     REVIEW OF SYSTEMS:  Negative in all 14 measured areas with the exception of   her HPI above.     PHYSICAL EXAMINATION:  VITAL SIGNS:  Today, the patient's blood pressure, pulse, temperature are   stable.  GENERAL:  Awake, alert and comfortable.  Pathology related to the hip.  HEENT:  Pupils equal, round, reactive.  EOMs full.  CHEST:  Nonlabored breathing.  ABDOMEN:  Soft and benign.  EXTREMITIES:  In the orthopedic exam today as related to the hip, she has pain   with flexion, internal and external rotation.  Leg lengths are equal.  NEUROLOGIC AND VASCULAR:  Otherwise intact.     IMAGING:  X-ray demonstrate advanced degenerative arthritis.     PLAN:  Total hip arthroplasty.  Risks, benefits all explained.  The patient is   consented for full surgical undertaking, answered all of her questions to her   satisfaction.        ______________________________  MD BRENNA PEDERSEN/MICHAEL/CARLTON    DD:  04/21/2021 11:10  DT:  04/21/2021 12:40    Job#:  [id number]

## 2021-06-14 ENCOUNTER — PHYSICAL THERAPY (OUTPATIENT)
Dept: PHYSICAL THERAPY | Facility: REHABILITATION | Age: 81
End: 2021-06-14
Attending: ORTHOPAEDIC SURGERY
Payer: MEDICARE

## 2021-06-14 DIAGNOSIS — M25.552 PAIN IN LEFT HIP: ICD-10-CM

## 2021-06-14 DIAGNOSIS — M16.12 UNILATERAL PRIMARY OSTEOARTHRITIS, LEFT HIP: ICD-10-CM

## 2021-06-14 DIAGNOSIS — Z96.642 PRESENCE OF LEFT ARTIFICIAL HIP JOINT: ICD-10-CM

## 2021-06-14 PROCEDURE — 97110 THERAPEUTIC EXERCISES: CPT

## 2021-06-14 PROCEDURE — 97161 PT EVAL LOW COMPLEX 20 MIN: CPT

## 2021-06-14 SDOH — ECONOMIC STABILITY: GENERAL: QUALITY OF LIFE: GOOD

## 2021-06-14 ASSESSMENT — ENCOUNTER SYMPTOMS
PAIN SCALE AT HIGHEST: 8
ALLEVIATING FACTORS: POSITION CHANGE
EXACERBATED BY: ACTIVITY
ALLEVIATING FACTORS: SITTING DOWN
PAIN TIMING: WHEN ACTIVE
PAIN TIMING: AT BEDTIME
PAIN SCALE: 5
PAIN LOCATION: L HIP
PAIN TIMING: IN THE EVENING
QUALITY: ACHING
PAIN SCALE AT LOWEST: 3
QUALITY: SHARP
ALLEVIATING FACTORS: REST
EXACERBATED BY: PROLONGED STANDING
ALLEVIATING FACTORS: POSITIONING
EXACERBATED BY: WALKING
EXACERBATED BY: MOVEMENT

## 2021-06-14 ASSESSMENT — ACTIVITIES OF DAILY LIVING (ADL)
AMBULATION_WITHOUT_ASSISTIVE_DEVICE: CONTACT GUARD ASSIST
AMBULATION_WITH_ASSISTIVE_DEVICE: INDEPENDENT
POOR_BALANCE: 1

## 2021-06-14 NOTE — OP THERAPY EVALUATION
Outpatient Physical Therapy  INITIAL EVALUATION    [facility] Physical Therapy [address]  [address]  [place] [address]  Phone:  [phone]  Fax:  [phone]    Date of Evaluation: 06/14/2021    Patient: Pamela Monaco  YOB: 1940  MRN: [id number]     Referring Provider: Sánchez Ortez M.D.  [address] [place] [address]   Referring Diagnosis Presence of left artificial hip joint [Z96.642]     Time Calculation  Start time: 1450  Stop time: 1540 Time Calculation (min): 50 minutes         Chief Complaint: Hip Problem and Post-Op Pain    Visit Diagnoses     ICD-10-CM   1. Presence of left artificial hip joint  Z96.642   2. Unilateral primary osteoarthritis, left hip  M16.12   3. Pain in left hip  M25.552       Date of onset of impairment: 4/8/2021    Subjective:   History of Present Illness:     Date of onset:  4/8/2021    Date of surgery:  4/8/2021    Mechanism of injury:  Pt's son present for interpretation. The pt had L CORETTA on 4/8/21, and reports that the surgery was without complications. She was discharged home from the hospital but did not receive any home health therapy. She states that she has been walking for exercise but is not participating in any other exercise program at this time. She experienced pain for about a month but is still having difficulty sleeping. In addition to the L hip the pt experiences pain in her L knee. Per the pt's son, the surgeon was pleased with her progress. She continues to have difficulty with stairs, getting in and out of bed, taking a shower, performing lower body dressing, and the pt does not perform house work or cooking as she lives with her daughter and son in law. The pt's main goal is to go to [place] in August for about a month.   Quality of life:  Good  Prior level of function:  Pt did require some help with IADLs prior to surgery, has been using rollator for several years.   Sleep disturbance:  Difficulty falling asleep  and interrupted sleep  Pain:     Current pain ratin    At best pain rating:  3    At worst pain ratin    Location:  L hip     Quality:  Sharp and aching    Pain timing:  When active, in the evening and at bedtime    Relieving factors:  Rest, positioning, position change and sitting down    Aggravating factors:  Activity, movement, prolonged standing and walking    Progression:  Improving  Social Support:     Lives with:  Adult children  Treatments:     Current treatment:  Heat and ice  Patient Goals:     Patient goals for therapy:  Decreased pain, improved balance, increased strength and independence with ADLs/IADLs    Other patient goals:  Improve her walking, be able to travel to [place]      Past Medical History:   Diagnosis Date   • Diabetes (HCC)     taking metformin   • Disorder of thyroid    • High cholesterol    • Hypertension    • Psychiatric problem     depression     Past Surgical History:   Procedure Laterality Date   • PB TOTAL HIP ARTHROPLASTY Left 2021    Procedure: ARTHROPLASTY, HIP, TOTAL;  Surgeon: Sánchez Ortez M.D.;  Location: SURGERY [facility];  Service: Orthopedics   • KNEE ARTHROPLASTY TOTAL Right 3/13/2017    Procedure: KNEE ARTHROPLASTY TOTAL;  Surgeon: Pop Cartwright M.D.;  Location: SURGERY [facility];  Service:    • OTHER ORTHOPEDIC SURGERY Right ?    Right knee surgery     Social History     Tobacco Use   • Smoking status: Never Smoker   • Smokeless tobacco: Never Used   Substance Use Topics   • Alcohol use: No     Family and Occupational History     Socioeconomic History   • Marital status:      Spouse name: Not on file   • Number of children: Not on file   • Years of education: Not on file   • Highest education level: Not on file   Occupational History   • Not on file       Objective     Observations   Left Hip  Positive for incision.     Additional Observation Details  Surgical incision clean and healing well    Neurological Testing     Sensation      Hip   Left Hip   Intact: light touch    Right Hip   Intact: light touch    Knee   Left Knee   Intact: light touch    Right Knee   Intact: light touch     Reflexes   Left   Patellar (L4): normal (2+)  Achilles (S1): trace (1+)    Right   Patellar (L4): normal (2+)  Achilles (S1): trace (1+)    Palpation   Left   Muscle spasm in the rectus femoris.   Tenderness of the adductor longus, distal biceps femoris, distal semimembranosus, gluteus evelyn, gluteus medius, medial gastrocnemius, rectus femoris and TFL.     Additional Palpation Details  Pt had difficulty maintaining supine position due to L LE discomfort    Tenderness     Left Hip   Tenderness in the greater trochanter.      Active Range of Motion   Left Hip   Flexion: 80 degrees   Abduction: 20 degrees   Adduction: 0 degrees   External rotation (90/90): 10 degrees   Internal rotation (90/90): 15 degrees     Right Hip   Normal active range of motion  External rotation (90/90): 35 degrees   Internal rotation (90/90): 25 degrees   Left Knee   Flexion: 90 degrees   Extension: -10 degrees     Passive Range of Motion   Left Knee   Flexion: 100 degrees   Extension: -5 degrees     Additional Passive Range of Motion Details  Crepitus noted in L patella with L knee flexion PROM    Strength:      Left Hip   Planes of Motion   Flexion: 3  Extension: 3  Abduction: 3  Adduction: 3  External rotation: 3  Internal rotation: 3    Right Hip   Normal muscle strength    Additional Strength Details  Pt unable to tolerate full manual resistance in L MMTs  Ambulation   Weight-Bearing Status   Weight-Bearing Status (Left): weight-bearing as tolerated   Assistive device used: rollator walker with seat    Ambulation: Level Surfaces   Ambulation with assistive device: independent  Ambulation without assistive device: contact guard assist    Observational Gait   Gait: asymmetric   Decreased walking speed, stride length, left stance time and left step length.   Left foot contact  pattern: foot flat  Base of support: normal    Quality of Movement During Gait     Pelvis    Pelvis (Left): Positive Trendelenburg.     Knee    Knee (Left): Positive stiff knee and valgus.     Comments   TU.78 sec with rollator  10MWT: 20.56 sec with rollator  Gait speed: 0.49 m/s      Functional Assessment     Comments  Pt required Min A from supine <> sit for L LE  Pt able to perform sit to stand with UE assist        Therapeutic Exercises (CPT 94293):       Therapeutic Exercise Summary: Access Code: [email or web address]  URL: [email or web address]  Date: 2021  Prepared by: Marquita Shelton    Exercises  Seated Long Arc Quad - 1 x daily - 7 x weekly - 10 reps - 3 sets  Standing March with Counter Support - 1 x daily - 7 x weekly - 10 reps - 3 sets  Heel rises with counter support - 1 x daily - 7 x weekly - 10 reps - 3 sets        Time-based treatments/modalities:    Physical Therapy Timed Treatment Charges  Therapeutic exercise minutes (CPT 84228): 8 minutes      Assessment, Response and Plan:   Impairments: abnormal gait, abnormal or restricted ROM, activity intolerance, impaired functional mobility, impaired balance, impaired physical strength, lacks appropriate home exercise program, limited mobility and pain with function    Assessment details:  Pamela Monaco is a 81 y.o. female who presents to skilled physical therapist initial evaluation s/p L CORETTA on 21 as well as chronic L knee pain. She presents with objective impairments in gait speed, gait mechanics, use of an AD, decreased L hip and knee ROM, decreased L LE strength, impaired dynamic mobility, limited independence with IADLs, and the pt is a fall risk based on her performance on the TUG and 10MWT. The impairments found during today's evaluation can be addressed by PT services and the pt appears motivated with good family support to participate in PT in order to improve her CLOF. It is anticipated that the pt will benefit from skilled therapy  intervention to address functional limitations and impairments detailed above, to address her current fall risk, and to maximize her CLOF and independence.   Barriers to therapy:  Language  Other barriers to therapy:  Multiple treatment areas  Prognosis: good    Goals:   Short Term Goals:   1. Pt will demonstrate TUG time to <30 seconds with LRAD to indicate improved dynamic mobility  2. Pt will report 50% improvement in her sleeping tolerance  3. Pt will be able to demonstrate L hip abduction MMT to 4/5  4. Pt will demonstrate self selected gait speed to at least 0.7 m/s      Long Term Goals:    1. Pt will be able to ascend a curb using her L LE with single handrail Mod I  2. Pt will demonstrate TUG to <13.5 sec to indicate decreased fall risk  3. Pt will be score at least 45/56 on the Levin Balance scale to indicate low fall risk  4. Pt will be independent with HEP    Plan:   Therapy options:  Physical therapy treatment to continue  Planned therapy interventions:  Gait Training (CPT 92632), Manual Therapy (CPT 59581), Neuromuscular Re-education (CPT 12471), Therapeutic Exercise (CPT 70645) and E Stim Unattended (CPT 31659)  Frequency:  2x week  Duration in weeks:  8  Duration in visits:  16  Discussed with:  Patient and family  Plan details:  Pt educated on their current objective clinical presentation, anticipated PT POC, and importance of adherence to prescribed HEP in order to maximize PT benefit.        Functional Assessment Used  Lower Extremity Functional Scale Total: 20     Referring provider co-signature:  I have reviewed this plan of care and my co-signature certifies the need for services.    Certification Period: 06/14/2021 to  08/09/21    Physician Signature: ________________________________ Date: ______________

## 2021-06-21 ENCOUNTER — PHYSICAL THERAPY (OUTPATIENT)
Dept: PHYSICAL THERAPY | Facility: REHABILITATION | Age: 81
End: 2021-06-21
Attending: ORTHOPAEDIC SURGERY
Payer: MEDICARE

## 2021-06-21 DIAGNOSIS — Z96.642 PRESENCE OF LEFT ARTIFICIAL HIP JOINT: ICD-10-CM

## 2021-06-21 DIAGNOSIS — M16.12 UNILATERAL PRIMARY OSTEOARTHRITIS, LEFT HIP: ICD-10-CM

## 2021-06-21 DIAGNOSIS — M25.552 PAIN IN LEFT HIP: ICD-10-CM

## 2021-06-21 PROCEDURE — 97110 THERAPEUTIC EXERCISES: CPT

## 2021-06-21 NOTE — OP THERAPY DAILY TREATMENT
Outpatient Physical Therapy  DAILY TREATMENT     [facility] Physical Therapy [address]  [address]  [place] [address]  Phone:  [phone]  Fax:  [phone]    Date: 06/21/2021    Patient: Pamela Monaco  YOB: 1940  MRN: [id number]     Time Calculation    Start time: 1003  Stop time: 1029 Time Calculation (min): 26 minutes         Chief Complaint: Difficulty Walking, Post-Op Pain, and Hip Problem    Visit #: 2    SUBJECTIVE:  The pt presents with her son today ambulating with 4WW. She denies any aggravation of symptoms after IE and is agreeable to PT.     OBJECTIVE:  Current objective measures: L sided trendelenburg in stance          Therapeutic Exercises (CPT 97392):     1. NuStep, B LE, L2 5 minutes     2. Standing marches, 20 reps    3. Standing hip flexion, 2x10 each side    4. Standing hip abduction, Unable without compensation    5. Standing hamstring curl , 10x each    6. Lateral steps with handheld assist, 2 laps    7. Sit to stand, 10 reps no UE    20. POC:  06/14/2021 to  08/09/21      Therapeutic Exercise Summary: Pt and son educated to continue with HEP      Time-based treatments/modalities:    Physical Therapy Timed Treatment Charges  Therapeutic exercise minutes (CPT 44439): 25 minutes      ASSESSMENT:   Response to treatment: The pt tolerated a progression in therex for LE mobility and gentle hip activation, but demonstrates significant compensatory patterns, especially for lateral hip strength activities. She was challenged with standing hip abduction exercise and was unable to complete the exercise without excess compensation from quad and TFL. She is hesitant to perform full weight bearing on the L LE and uses excess hand-held assist for balance when in SLS on the L. The pt will continue to benefit from skilled physical therapy intervention to maximize her return to PLOF.     PLAN/RECOMMENDATIONS:   Plan for treatment: therapy treatment to continue next  visit.  Planned interventions for next visit: continue with current treatment. Continue to progress LE strengthening and flexibility.

## 2021-06-23 ENCOUNTER — PHYSICAL THERAPY (OUTPATIENT)
Dept: PHYSICAL THERAPY | Facility: REHABILITATION | Age: 81
End: 2021-06-23
Attending: ORTHOPAEDIC SURGERY
Payer: MEDICARE

## 2021-06-23 DIAGNOSIS — Z96.642 PRESENCE OF LEFT ARTIFICIAL HIP JOINT: ICD-10-CM

## 2021-06-23 DIAGNOSIS — M16.12 UNILATERAL PRIMARY OSTEOARTHRITIS, LEFT HIP: ICD-10-CM

## 2021-06-23 PROCEDURE — 97110 THERAPEUTIC EXERCISES: CPT

## 2021-06-23 NOTE — OP THERAPY DAILY TREATMENT
"  Outpatient Physical Therapy  DAILY TREATMENT     [facility] Physical Therapy [address]  [address]  [place] [address]  Phone:  [phone]  Fax:  [phone]    Date: 06/23/2021    Patient: Pamela Monaco  YOB: 1940  MRN: [id number]     Time Calculation    Start time: 1030  Stop time: 1100 Time Calculation (min): 30 minutes         Chief Complaint: Hip Problem and Post-Op Pain    Visit #: 3    SUBJECTIVE:  The pt reports mild soreness in her hip after last session, but no significant pain. She is ambulating with rollator and presents with her daughter today. Utilized Stateless language line  during today's session.     OBJECTIVE:  Current objective measures: L trendelenburg in SLS          Therapeutic Exercises (CPT 30189):     1. NuStep, B LE, L1 5 minutes     2. Standing marches, 20 reps    3. Standing hip flexion, 2x10 each side    4. Standing hip abduction, 10x each side    5. Standing heel raises, 10x each    6. Lateral steps in // bars, 2 laps    7. Step ups to 2\" step, 10 reps no UE    20. POC:  06/14/2021 to  08/09/21      Therapeutic Exercise Summary: Pt and son educated to continue with HEP      Time-based treatments/modalities:    Physical Therapy Timed Treatment Charges  Therapeutic exercise minutes (CPT 82982): 26 minutes    ASSESSMENT:   Response to treatment: The pt demonstrated improved L hip AROM into flexion and abduction this session with external cue of therapist hand. She continues to demonstrate decreased stance time on the L LE and requires cueing for upright posture. The pt fatigues quickly with standing activities. She will continue to benefit from skilled therapy intervention to maximize CLOF.     PLAN/RECOMMENDATIONS:   Plan for treatment: therapy treatment to continue next visit.  Planned interventions for next visit: continue with current treatment. Continue with progression of LE strengthening and endurance.        "

## 2021-06-28 ENCOUNTER — PHYSICAL THERAPY (OUTPATIENT)
Dept: PHYSICAL THERAPY | Facility: REHABILITATION | Age: 81
End: 2021-06-28
Attending: ORTHOPAEDIC SURGERY
Payer: MEDICARE

## 2021-06-28 DIAGNOSIS — M16.12 UNILATERAL PRIMARY OSTEOARTHRITIS, LEFT HIP: ICD-10-CM

## 2021-06-28 DIAGNOSIS — Z96.642 PRESENCE OF LEFT ARTIFICIAL HIP JOINT: ICD-10-CM

## 2021-06-28 DIAGNOSIS — M25.552 PAIN IN LEFT HIP: ICD-10-CM

## 2021-06-28 PROCEDURE — 97110 THERAPEUTIC EXERCISES: CPT

## 2021-06-28 NOTE — OP THERAPY DAILY TREATMENT
"  Outpatient Physical Therapy  DAILY TREATMENT     [facility] Physical Therapy [address]  [address]  [place] [address]  Phone:  [phone]  Fax:  [phone]    Date: 06/28/2021    Patient: Pamela Monaco  YOB: 1940  MRN: [id number]     Time Calculation    Start time: 1530  Stop time: 1559 Time Calculation (min): 29 minutes         Chief Complaint: Hip Problem and Post-Op Pain    Visit #: 4    SUBJECTIVE:  The pt presents today with her rollator and is with her son. He states that they are doing exercises at home, and some days she is stronger than others She reports mild pain in her knee currently but she is agreeable to PT.     OBJECTIVE:  Current objective measures: Decreased stance time L LE, trendelenburg gait with stance on the L          Therapeutic Exercises (CPT 10381):     1. NuStep, B LE, L1 5 minutes     2. Standing marches, 20 reps    3. Posterior walking in // bars, 2 laps    4. Standing hamstring curl, 10 reps each side    5. Forward walking without UE assist, 3 laps    6. Lateral steps in // bars, 2 laps    7. Step ups to 4\"step, 10 reps with B UE assist at bars    20. POC:  06/14/2021 to  08/09/21      Therapeutic Exercise Summary: Pt and son educated to continue with HEP      Time-based treatments/modalities:    Physical Therapy Timed Treatment Charges  Therapeutic exercise minutes (CPT 67381): 28 minutes      ASSESSMENT:   Response to treatment: The pt continues to demonstrate apprehension with weight bearing through the L LE and decreased stance time on the L. She reports intermittent L knee pain during exercises, but denied any hip discomfort. She was challenged with progression of step up task but was able to complete the exercise bilaterally, but utilized greater UE assist with the L LE as stance limb. The pt will continue to benefit from skilled physical therapy intervention to maximize return to PLOF.     PLAN/RECOMMENDATIONS:   Plan for treatment: " therapy treatment to continue next visit.  Planned interventions for next visit: continue with current treatment. Continue to progress gait mechanics and functional strengthening.

## 2021-06-30 ENCOUNTER — PHYSICAL THERAPY (OUTPATIENT)
Dept: PHYSICAL THERAPY | Facility: REHABILITATION | Age: 81
End: 2021-06-30
Attending: ORTHOPAEDIC SURGERY
Payer: MEDICARE

## 2021-06-30 DIAGNOSIS — M25.552 PAIN IN LEFT HIP: ICD-10-CM

## 2021-06-30 DIAGNOSIS — Z96.642 PRESENCE OF LEFT ARTIFICIAL HIP JOINT: ICD-10-CM

## 2021-06-30 DIAGNOSIS — M16.12 UNILATERAL PRIMARY OSTEOARTHRITIS, LEFT HIP: ICD-10-CM

## 2021-06-30 PROCEDURE — 97110 THERAPEUTIC EXERCISES: CPT

## 2021-06-30 NOTE — OP THERAPY DAILY TREATMENT
"  Outpatient Physical Therapy  DAILY TREATMENT     [facility] Physical Therapy [address].  [address]  [place] [address]  Phone:  [phone]  Fax:  [phone]    Date: 06/30/2021    Patient: Pamela Monaco  YOB: 1940  MRN: [id number]     Time Calculation    Start time: 1531  Stop time: 1557 Time Calculation (min): 26 minutes         Chief Complaint: Difficulty Walking, Loss Of Balance, and Hip Problem    Visit #: 5    SUBJECTIVE:  The pt presents ambulating with rollator and is agreeable to PT. She states that her knee is painful but she hasn't been experiencing any discomfort in her hip. The pt is agreeable to PT today.     OBJECTIVE:  Current objective measures: Pt demonstrates fair quad contraction          Therapeutic Exercises (CPT 36408):     1. NuStep, B LE, L1 7 minutes     2. Quad sets, 10x5\"    3. SAQ, 10x    4. Ball bridges, 10x2\" hold    5. HS curl with SB, 10x     6. Seated marches edge of plint, 10x each side    20. POC:  06/14/2021 to  08/09/21      Therapeutic Exercise Summary: Pt and son educated to continue with HEP      Time-based treatments/modalities:    Physical Therapy Timed Treatment Charges  Therapeutic exercise minutes (CPT 70504): 24 minutes    ASSESSMENT:   Response to treatment: The pt demonstrated improved tolerance to Nustep, relying less on trunk lateral flexion for momentum. She tolerated progression to supine exercises which has previously not been tolerated well. She continues to present with decreased contraction of the L quad and hip flexors and will benefit from progressive strengthening program to promote normalized use of the L LE. She continues to have good family support to assist with participation in HEP and will benefit from ongoing skilled care to maximize her CLOF.     PLAN/RECOMMENDATIONS:   Plan for treatment: therapy treatment to continue next visit.  Planned interventions for next visit: continue with current treatment. " Continue to progress LE strength, gait mechanics, and balance.

## 2021-07-07 ENCOUNTER — PHYSICAL THERAPY (OUTPATIENT)
Dept: PHYSICAL THERAPY | Facility: REHABILITATION | Age: 81
End: 2021-07-07
Attending: ORTHOPAEDIC SURGERY
Payer: MEDICARE

## 2021-07-07 DIAGNOSIS — M16.12 UNILATERAL PRIMARY OSTEOARTHRITIS, LEFT HIP: ICD-10-CM

## 2021-07-07 DIAGNOSIS — M25.552 PAIN IN LEFT HIP: ICD-10-CM

## 2021-07-07 DIAGNOSIS — Z96.642 PRESENCE OF LEFT ARTIFICIAL HIP JOINT: ICD-10-CM

## 2021-07-07 PROCEDURE — 97110 THERAPEUTIC EXERCISES: CPT

## 2021-07-07 NOTE — OP THERAPY DAILY TREATMENT
"  Outpatient Physical Therapy  DAILY TREATMENT     [facility] Physical Therapy [address]  [address]  [place] [address]  Phone:  [phone]  Fax:  [phone]    Date: 07/07/2021    Patient: Pamela Monaco  YOB: 1940  MRN: [id number]     Time Calculation    Start time: 1030  Stop time: 1110 Time Calculation (min): 40 minutes         Chief Complaint: Difficulty Walking and Hip Problem    Visit #: 6    SUBJECTIVE:  The pt states that her hip is feeling good, but her knee is still sore. She denies any increased symptoms today and is agreeable to PT. She presents with her daughter and is ambulating with rollator.     OBJECTIVE:  Current objective measures: Decreased stance time L LE in gait          Therapeutic Exercises (CPT 63461):     1. NuStep, B LE, L1 10 minutes     2. Quad sets, 10x5\"    3. SAQ, 20x    4. Ball bridges, 10x2\" hold    5. HS curl with SB, 10x     6. LAQ, 10x each side    7. Standing marches, 10x    8. Standing hip flexion , 10x    9. Standing hip abduction, 10x    10. Lateral walks , 3L with handheld assist    20. POC:  06/14/2021 to  08/09/21      Therapeutic Exercise Summary: Pt and son educated to continue with HEP      Time-based treatments/modalities:    Physical Therapy Timed Treatment Charges  Therapeutic exercise minutes (CPT 28323): 39 minutes    ASSESSMENT:   Response to treatment: The pt tolerated a progression in therex for LE strengthening with ongoing fatigue on the L LE compared to the R. She continues to have limited contraction of her L quad secondary to knee pain but utilized B activation during quad exercises to promote overflow activation. The pt will continue to benefit from skilled physical therapy intervention to address ongoing limitations in strength, balance, mobility, and to facilitate return to OF.     PLAN/RECOMMENDATIONS:   Plan for treatment: therapy treatment to continue next visit.  Planned interventions for next visit: " continue with current treatment. Continue with gait training, functional strengthening, and balance activities.

## 2021-07-09 ENCOUNTER — PHYSICAL THERAPY (OUTPATIENT)
Dept: PHYSICAL THERAPY | Facility: REHABILITATION | Age: 81
End: 2021-07-09
Attending: ORTHOPAEDIC SURGERY
Payer: MEDICARE

## 2021-07-09 DIAGNOSIS — Z96.642 PRESENCE OF LEFT ARTIFICIAL HIP JOINT: ICD-10-CM

## 2021-07-09 DIAGNOSIS — M16.12 UNILATERAL PRIMARY OSTEOARTHRITIS, LEFT HIP: ICD-10-CM

## 2021-07-09 DIAGNOSIS — M25.552 PAIN IN LEFT HIP: ICD-10-CM

## 2021-07-09 PROCEDURE — 97110 THERAPEUTIC EXERCISES: CPT

## 2021-07-09 NOTE — OP THERAPY DAILY TREATMENT
"  Outpatient Physical Therapy  DAILY TREATMENT     [facility] Physical [address].  [address]  [place] [address]  Phone:  [phone]  Fax:  [phone]    Date: 07/09/2021    Patient: Pamela Monaco  YOB: 1940  MRN: [id number]     Time Calculation    Start time: 1035  Stop time: 1115 Time Calculation (min): 40 minutes         Chief Complaint: Difficulty Walking and Hip Problem    Visit #: 7    SUBJECTIVE:  The pt presents today with rollator and states that she is experiencing mild knee pain and no significant hip pain. She is agreeable to PT today.     OBJECTIVE:  Current objective measures: Pt demonstrates fair L quad contraction against gravity          Therapeutic Exercises (CPT 26724):     1. NuStep, B LE, L1 8 minutes     2. Quad sets, 10x5\"    3. SAQ, 2x10 each side, Max tactile and verbal cueing    4. Ball bridges, 10x2\" hold    5. HS curl with SB, 20x    6. LAQ, 10x each side    7. Standing marches, 10x    8. Standing hip flexion , 10x    10. Lateral walks , 3L with handheld assist    11. Seated marches, 15x each side    12. Sit to stand, 2x10 without UE    20. POC:  06/14/2021 to  08/09/21      Therapeutic Exercise Summary: Pt and son educated to continue with HEP      Time-based treatments/modalities:    Physical Therapy Timed Treatment Charges  Therapeutic exercise minutes (CPT 68619): 38 minutes    ASSESSMENT:   Response to treatment: The pt continues to demonstrate limited strength of the L LE, particularly knee extension AROM against gravity. She is making improvements in her strength and functional mobility and is able to walk around the gym without her AD, with supervision only. The pt will continue to benefit from skilled physical therapy intervention to maximize her independence, safety, and CLOF.     PLAN/RECOMMENDATIONS:   Plan for treatment: therapy treatment to continue next visit.  Planned interventions for next visit: continue with current " treatment. Continue with functional strengthening and independence with mobility tasks.

## 2021-07-13 ENCOUNTER — PHYSICAL THERAPY (OUTPATIENT)
Dept: PHYSICAL THERAPY | Facility: REHABILITATION | Age: 81
End: 2021-07-13
Attending: ORTHOPAEDIC SURGERY
Payer: MEDICARE

## 2021-07-13 DIAGNOSIS — Z96.642 PRESENCE OF LEFT ARTIFICIAL HIP JOINT: ICD-10-CM

## 2021-07-13 DIAGNOSIS — M16.12 UNILATERAL PRIMARY OSTEOARTHRITIS, LEFT HIP: ICD-10-CM

## 2021-07-13 DIAGNOSIS — M25.552 PAIN IN LEFT HIP: ICD-10-CM

## 2021-07-13 PROCEDURE — 97110 THERAPEUTIC EXERCISES: CPT

## 2021-07-13 NOTE — OP THERAPY DAILY TREATMENT
Outpatient Physical Therapy  DAILY TREATMENT     [facility] Physical Therapy [address].  [address]  [place] [address]  Phone:  [phone]  Fax:  [phone]    Date: 07/13/2021    Patient: Pamela Monaco  YOB: 1940  MRN: [id number]     Time Calculation    Start time: 1117  Stop time: 1157 Time Calculation (min): 40 minutes         Chief Complaint: Difficulty Walking, Hip Problem, and Loss Of Balance    Visit #: 8    SUBJECTIVE:  The pt has no new complaints or changes to report regarding her knee. She is ambulating with FWW and is agreeable to PT.     OBJECTIVE:  Current objective measures: Oxygen 95%, 78 bpm          Therapeutic Exercises (CPT 84216):     1. NuStep, B LE, L2 10 minutes    3. Straight leg raise, 10x each     4. Bridges, 20x    5. Clamshell, 2x10    6. LAQ, 10x each side    7. Standing marches    8. Standing hip flexion     10. Lateral walks , 3L with handheld assist    11. Seated marches, 15x each side    12. Sit to stand, 2x10 without UE    13. Posterior walking, 2 L with handheld assist    20. POC:  06/14/2021 to  08/09/21      Therapeutic Exercise Summary: Pt and son educated to continue with HEP      Time-based treatments/modalities:    Physical Therapy Timed Treatment Charges  Therapeutic exercise minutes (CPT 28401): 38 minutes    ASSESSMENT:   Response to treatment: The pt is making significant improvements in her hip strength and mobility, but continues to be limited by L knee pain and limited ROM. Her gait mechanics and safety with balance tasks is improving and she is demonstrating improved confidence with unsupported walking. The pt is making good functional progress.     PLAN/RECOMMENDATIONS:   Plan for treatment: therapy treatment to continue next visit.  Planned interventions for next visit: continue with current treatment. Complete progress update next session.

## 2021-07-16 ENCOUNTER — PHYSICAL THERAPY (OUTPATIENT)
Dept: PHYSICAL THERAPY | Facility: REHABILITATION | Age: 81
End: 2021-07-16
Attending: ORTHOPAEDIC SURGERY
Payer: MEDICARE

## 2021-07-16 DIAGNOSIS — M25.552 PAIN IN LEFT HIP: ICD-10-CM

## 2021-07-16 DIAGNOSIS — Z96.642 PRESENCE OF LEFT ARTIFICIAL HIP JOINT: ICD-10-CM

## 2021-07-16 DIAGNOSIS — M16.12 UNILATERAL PRIMARY OSTEOARTHRITIS, LEFT HIP: ICD-10-CM

## 2021-07-16 PROCEDURE — 97112 NEUROMUSCULAR REEDUCATION: CPT

## 2021-07-16 PROCEDURE — 97110 THERAPEUTIC EXERCISES: CPT

## 2021-07-16 ASSESSMENT — BALANCE ASSESSMENTS
STANDING UNSUPPORTED WITH EYES CLOSED: 3
LOOK OVER LEFT AND RIGHT SHOULDERS WHILE STANDING: 2
REACHING FORWARD WITH OUTSTRETCHED ARM WHILE STANDING: 2
STANDING UNSUPPORTED ONE FOOT IN FRONT: 3
STANDING UNSUPPORTED WITH FEET TOGETHER: 3
STANDING UNSUPPORTED: 4
LONG VERSION TOTAL SCORE (MAX 56): 41
TURN 360 DEGREES: 2
PICK UP OBJECT FROM THE FLOOR FROM A STANDING POSITION: 4
LONG VERSION TOTAL SCORE (MAX 56): 41
STANDING TO SITTING: 4
PLACE ALTERNATE FOOT ON STEP OR STOOL WHILE STANDING UNSUPPORTED: 2
SITTING TO STANDING: 3
SITTING UNSUPPORTED: 4
TRANSFERS: 4
STANDING ON ONE LEG: 1

## 2021-07-16 NOTE — OP THERAPY DAILY TREATMENT
"  Outpatient Physical Therapy  DAILY TREATMENT     [facility] Physical [address].  [address]  [place] [address]  Phone:  [phone]  Fax:  [phone]    Date: 07/16/2021    Patient: Pamela Monaco  YOB: 1940  MRN: [id number]     Time Calculation    Start time: 1115  Stop time: 1156 Time Calculation (min): 41 minutes         Chief Complaint: Hip Problem    Visit #: 9    SUBJECTIVE:  Since starting physical therapy the pt reports overall improvements in her hip and knee pain and that she is walking better. The pt continues to require help with showering and bed transfers, but she required help prior to her surgery as well.     OBJECTIVE:  Current objective measures: See progress update           Therapeutic Exercises (CPT 46746):     1. NuStep, B LE, L2 10 minutes    2. Standing hip abduction, 10 reps each side    3. Straight leg raise, HEP    4. Bridges, HEP    5. Clamshell, HEP    6. LAQ, HEP    7. Standing marches, 10x each with handheld assist    8. Standing hip flexion , 10x each    9. Step ups, 6\" step 10 reps each with B hand held    10. Lateral walks , 3L with handheld assist    11. Seated marches, HEP    12. Sit to stand, HEP    13. Posterior walking, 2 L with handheld assist      Therapeutic Exercise Summary: Pt and son educated to continue with HEP    Therapeutic Treatments and Modalities:     1. Neuromuscular Re-education (CPT 12237), see below    Therapeutic Treatment and Modalities Summary: Standing balance tasks including unsupported standing, narrow stance balance, semi-tandem stance, attempted SLS, turning each direction, object pickup from floor, overground walking and turning with rollator    Time-based treatments/modalities:    Physical Therapy Timed Treatment Charges  Neuromusc re-ed, balance, coor, post minutes (CPT 05119): 15 minutes  Therapeutic exercise minutes (CPT 06004): 23 minutes    ASSESSMENT:   Response to treatment: See progress " update    PLAN/RECOMMENDATIONS:   Plan for treatment: Continue with current treatment.  Planned interventions for next visit: Continue to progress pt's gait mechanics, hip strength, and functional mobility.

## 2021-07-16 NOTE — OP THERAPY PROGRESS SUMMARY
Outpatient Physical Therapy  PROGRESS SUMMARY NOTE      [facility] Physical Therapy [place] [address]  [address]  [place] [address]  Phone:  [phone]  Fax:  [phone]    Date of Visit: 2021    Patient: Pamela Monaco  YOB: 1940  MRN: [id number]     Referring Provider: Sánchez Ortez M.D.  [address] [place] [address]   Referring Diagnosis Presence of left artificial hip joint [Z96.642]     Visit Diagnoses     ICD-10-CM   1. Presence of left artificial hip joint  Z96.642   2. Pain in left hip  M25.552   3. Unilateral primary osteoarthritis, left hip  M16.12       Rehab Potential: good    Progress Report Period: 21-21    Functional Assessment Used  Levin Balance Total Score (0-56): 41       Objective Findings and Assessment:   Patient progression towards goals: Pamela Monaco presents for skilled physical therapist progress update following 9 PT visits since the start of care for aftercare following L CORETTA and chronic L knee pain. Since the start of care the pt has made objective improvements in dynamic mobility, gait speed, static and dynamic balance, functional LE strength, and independence with IADLs. She continues to have limitations in gait speed and mechanics, L hip and knee strength, dynamic mobility, balance, and is a fall risk based on the TUG, Levin, and 10MWT. The pt has made progress towards all initially established PT goals and does continue to participate in HEP with the supervision of her children. Based on the pt's improvements since the start of care but remaining functional limitations and impairments detailed above, she will continue to benefit from skilled physical therapy intervention to address ongoing impairments, to minimize her current fall risk, and to maximize her independence and safety with IADLs.     Objective findings and assessment details: 10MWT: 16.16 sec with rollator  Gait speed: 0.61 m/s  TU.36 sec with  rollator  Levin/45    Goals:   Short Term Goals:   1. Pt will demonstrate TUG time to <30 seconds with LRAD to indicate improved dynamic mobility (MET)  2. Pt will report 50% improvement in her sleeping tolerance (In progress, continue)  3. Pt will be able to demonstrate L hip abduction MMT to 4/5 (In progress, continue)  4. Pt will demonstrate self selected gait speed to at least 0.7 m/s (In progress, continue)    Short term goal time span:  2-4 weeks      Long Term Goals:    1. Pt will be able to ascend a curb using her L LE with single handrail Mod I (In progress, continue)  2. Pt will demonstrate TUG to <13.5 sec to indicate decreased fall risk (In progress, continue)  3. Pt will be score at least 45/56 on the Levin Balance scale to indicate low fall risk (In progress continue)  4. Pt will be independent with HEP (In progress continue)  Long term goal time span:  4-6 weeks    Plan:   Planned therapy interventions:  Gait Training (CPT 71668), Functional Training, Self Care (CPT 34918), Neuromuscular Re-education (CPT 31124), Therapeutic Activities (CPT 81343), Therapeutic Exercise (CPT 35298) and Manual Therapy (CPT 68496)  Frequency:  2x week  Duration in weeks:  5  Duration in visits:  10  Plan details:  Continue to progress functional LE strength, dynamic balance, gait mechanics, and independence with IADLs      Referring provider co-signature:  I have reviewed this plan of care and my co-signature certifies the need for services.     Certification Period: 2021 to 21    Physician Signature: ________________________________ Date: ______________

## 2021-07-19 ENCOUNTER — PHYSICAL THERAPY (OUTPATIENT)
Dept: PHYSICAL THERAPY | Facility: REHABILITATION | Age: 81
End: 2021-07-19
Attending: INTERNAL MEDICINE
Payer: MEDICARE

## 2021-07-19 DIAGNOSIS — M25.552 PAIN IN LEFT HIP: ICD-10-CM

## 2021-07-19 DIAGNOSIS — M16.12 UNILATERAL PRIMARY OSTEOARTHRITIS, LEFT HIP: ICD-10-CM

## 2021-07-19 DIAGNOSIS — Z96.642 PRESENCE OF LEFT ARTIFICIAL HIP JOINT: ICD-10-CM

## 2021-07-19 PROCEDURE — 97116 GAIT TRAINING THERAPY: CPT

## 2021-07-19 PROCEDURE — 97110 THERAPEUTIC EXERCISES: CPT

## 2021-07-19 NOTE — OP THERAPY DAILY TREATMENT
Outpatient Physical Therapy  DAILY TREATMENT     [facility] Physical Therapy [address]  [address]  [place] [address]  Phone:  [phone]  Fax:  [phone]    Date: 07/19/2021    Patient: Pamela Monaco  YOB: 1940  MRN: [id number]     Time Calculation    Start time: 1031  Stop time: 1111 Time Calculation (min): 40 minutes         Chief Complaint: Hip Problem, Knee Problem, and Difficulty Walking    Visit #: 10    SUBJECTIVE:  The pt presents today ambulating with her FWW and is agreeable to PT. She has no other new complaints or changes to report.     OBJECTIVE:  Current objective measures: Trendelenburg on the L in single limb stance          Therapeutic Exercises (CPT 92576):     1. NuStep, B LE, L2 10 minutes    2. Sit to stand, 15 reps without UE    3. Straight leg raise, HEP    4. Bridges, HEP    5. Clamshell, HEP    6. LAQ, HEP    7. Standing marches, 10x each with handheld assist    8. Standing hip abduction, 10x each with handheld assist      Therapeutic Exercise Summary: Pt and son educated to continue with HEP with added lateral walks    Therapeutic Treatments and Modalities:     1. Gait Training (CPT 53508), see below    Therapeutic Treatment and Modalities Summary: Gait Belt donned during overground walking and balance  -Walking without AD, x5 minutes, 134 ft  -Overground walking with SPC, verbal and visual cueing for gait mechanics with SPC, 134 ft.     Pt's son educated that they can start having patient use the SPC for ambulation within the home.     Time-based treatments/modalities:    Physical Therapy Timed Treatment Charges  Gait training minutes (CPT 93848): 15 minutes  Therapeutic exercise minutes (CPT 92288): 23 minutes      ASSESSMENT:   Response to treatment: The pt did well with progression to ambulation with SPC and demonstrated improved L hip stability and symmetry of gait with SPC compared to no AD. She is demonstrating improvements in her pain  levels and activity tolerance but does still present with significant limitations in her balance and strength on the L LE. The pt has good family support for ongoing participation in HEP. She will continue to benefit from skilled physical therapy intervention to maximize her CLOF, safety, and independence.     PLAN/RECOMMENDATIONS:   Plan for treatment: therapy treatment to continue next visit.  Planned interventions for next visit: continue with current treatment. Continue to progress gait and balance training.

## 2021-07-21 ENCOUNTER — PHYSICAL THERAPY (OUTPATIENT)
Dept: PHYSICAL THERAPY | Facility: REHABILITATION | Age: 81
End: 2021-07-21
Attending: INTERNAL MEDICINE
Payer: MEDICARE

## 2021-07-21 DIAGNOSIS — M16.12 UNILATERAL PRIMARY OSTEOARTHRITIS, LEFT HIP: ICD-10-CM

## 2021-07-21 DIAGNOSIS — M25.552 PAIN IN LEFT HIP: ICD-10-CM

## 2021-07-21 DIAGNOSIS — Z96.642 PRESENCE OF LEFT ARTIFICIAL HIP JOINT: ICD-10-CM

## 2021-07-21 PROCEDURE — 97110 THERAPEUTIC EXERCISES: CPT

## 2021-07-21 PROCEDURE — 97116 GAIT TRAINING THERAPY: CPT

## 2021-07-21 NOTE — OP THERAPY DAILY TREATMENT
Outpatient Physical Therapy  DAILY TREATMENT     [facility] Physical Therapy [address]  [address]  [place] [address]  Phone:  [phone]  Fax:  [phone]    Date: 07/21/2021    Patient: Pamela Monaco  YOB: 1940  MRN: [id number]     Time Calculation    Start time: 1045  Stop time: 1112 Time Calculation (min): 27 minutes         Chief Complaint: Hip Problem, Difficulty Walking, and Loss Of Balance    Visit #: 11    SUBJECTIVE:  The pt states that her knee is feeling a little better. She is ambulating with rollator and is agreeable to PT.     OBJECTIVE:  Current objective measures: Pt able to ambulate safely with SPC with supervision only          Therapeutic Exercises (CPT 05898):     1. NuStep, B LE, L2 8 minutes    2. Sit to stand, 15 reps without UE    3. Straight leg raise, HEP    4. Bridges, HEP    5. Clamshell, HEP    6. LAQ, HEP    7. Standing marches, HEP    8. Standing hip abduction, HEP      Therapeutic Exercise Summary: Pt educated to continue with HEP.     Therapeutic Treatments and Modalities:     1. Gait Training (CPT 55335), see below    Therapeutic Treatment and Modalities Summary: Gait Belt donned during overground walking and balance  -Gait training overground with SPC 1x242 ft. Visual and verbal cueing for gait mechanics with SPC.   1x 280 ft.     Pt's son educated that they can start having patient use the SPC for ambulation within the home.     Time-based treatments/modalities:    Physical Therapy Timed Treatment Charges  Gait training minutes (CPT 24079): 15 minutes  Therapeutic exercise minutes (CPT 81769): 10 minutes      ASSESSMENT:   Response to treatment: The pt tolerated a progression in gait training with SPC demonstrating improved walking endurance, stability on the L LE, and safety with overground ambulation. She is demonstrating significant improvements in her functional LE strength. She is making steady progress towards PT goals and will  continue to benefit from skilled physical therapy intervention to maximize her return to PLOF.     PLAN/RECOMMENDATIONS:   Plan for treatment: therapy treatment to continue next visit.  Planned interventions for next visit: continue with current treatment. Continue to progress overground gait and balance training.

## 2021-07-29 ENCOUNTER — PHYSICAL THERAPY (OUTPATIENT)
Dept: PHYSICAL THERAPY | Facility: REHABILITATION | Age: 81
End: 2021-07-29
Attending: ORTHOPAEDIC SURGERY
Payer: MEDICARE

## 2021-07-29 DIAGNOSIS — M16.12 UNILATERAL PRIMARY OSTEOARTHRITIS, LEFT HIP: ICD-10-CM

## 2021-07-29 DIAGNOSIS — Z96.642 PRESENCE OF LEFT ARTIFICIAL HIP JOINT: ICD-10-CM

## 2021-07-29 DIAGNOSIS — M25.552 PAIN IN LEFT HIP: ICD-10-CM

## 2021-07-29 PROCEDURE — 97116 GAIT TRAINING THERAPY: CPT

## 2021-07-29 PROCEDURE — 97110 THERAPEUTIC EXERCISES: CPT

## 2021-07-29 NOTE — OP THERAPY DAILY TREATMENT
"  Outpatient Physical Therapy  DAILY TREATMENT     [facility] Physical Therapy [address].  [address]  [place] [address]  Phone:  [phone]  Fax:  [phone]    Date: 07/29/2021    Patient: Pamela Monaco  YOB: 1940  MRN: [id number]     Time Calculation    Start time: 1400  Stop time: 1441 Time Calculation (min): 41 minutes         Chief Complaint: Knee Problem and Hip Problem    Visit #: 12    SUBJECTIVE:  The pt states that her hip is doing well, but that her knee continues to be very bothersome. Her son reports that she is walking more regularly around the house without the AD. She is agreeable to PT.     OBJECTIVE:  Current objective measures: Pt able to ambulate overground without AD with supervision, [facility]          Therapeutic Exercises (CPT 31660):     1. NuStep, B LE, L2 8 minutes    2. Sit to stand, HEP    3. Straight leg raise, HEP    4. Bridges, HEP    5. Clamshell, HEP    6. LAQ, HEP    7. Standing marches, HEP    8. Standing hip abduction, HEP    9. Shuttle LE press - DL 7 cords, 3x10    10. Shuttle LE press - SL 4 cords, 2x10    11. Step ups 4\" step, 10x each with verbal cueing for performance, B to U UE assist at bars    20. POC: 07/16/2021 to 09/03/21      Therapeutic Exercise Summary: Pt educated to continue with HEP.     Therapeutic Treatments and Modalities:     1. Gait Training (CPT 46108), see below    Therapeutic Treatment and Modalities Summary: Gait Belt donned during overground walking and balance  -Gait training overground with SPC 1x250 ft. Visual and verbal cueing for gait mechanics with SPC.   -Overground lateral walking 2x30' without handheld assist  -Overground posterior walking 2x30' without handheld assist    Pt's son educated that they can start having patient use the SPC for ambulation within the home.     Time-based treatments/modalities:    Physical Therapy Timed Treatment Charges  Gait training minutes (CPT 54895): 15 " minutes  Therapeutic exercise minutes (CPT 25592): 23 minutes    ASSESSMENT:   Response to treatment: The pt tolerated a progression in therex for functional LE strengthening and stability without reports of increased symptoms. She required constant verbal cueing for performance of step up task but was able to perform step up with the L LE, but still requires unilateral UE assist. Her gait mechanics with the SPC are improving, but she is still limited by L sided trendelenburg. The pt will continue to benefit from skilled physical therapy intervention to maximize return to PLOF.     PLAN/RECOMMENDATIONS:   Plan for treatment: therapy treatment to continue next visit.  Planned interventions for next visit: continue with current treatment. Continue with progression of functional strength and balance training.

## 2021-08-04 ENCOUNTER — PHYSICAL THERAPY (OUTPATIENT)
Dept: PHYSICAL THERAPY | Facility: REHABILITATION | Age: 81
End: 2021-08-04
Attending: ORTHOPAEDIC SURGERY
Payer: MEDICARE

## 2021-08-04 DIAGNOSIS — M16.12 UNILATERAL PRIMARY OSTEOARTHRITIS, LEFT HIP: ICD-10-CM

## 2021-08-04 DIAGNOSIS — Z96.642 PRESENCE OF LEFT ARTIFICIAL HIP JOINT: ICD-10-CM

## 2021-08-04 DIAGNOSIS — M25.552 PAIN IN LEFT HIP: ICD-10-CM

## 2021-08-04 PROCEDURE — 97116 GAIT TRAINING THERAPY: CPT

## 2021-08-04 PROCEDURE — 97110 THERAPEUTIC EXERCISES: CPT

## 2021-08-04 NOTE — OP THERAPY DAILY TREATMENT
Outpatient Physical Therapy  DAILY TREATMENT     [facility] Physical Therapy [address]  [address]  [place] [address]  Phone:  [phone]  Fax:  [phone]    Date: 08/04/2021    Patient: Pamela Monaco  YOB: 1940  MRN: [id number]     Time Calculation    Start time: 1202  Stop time: 1242 Time Calculation (min): 40 minutes         Chief Complaint: Difficulty Walking, Hip Problem, and Knee Problem    Visit #: 13    SUBJECTIVE:  The pt states that her hip and knee are a little sore but she is agreeable to PT.     OBJECTIVE:  Current objective measures: Pt able to ambulate overground without AD but demonstrates increased L lateral trunk lean during gait, decreased stance time L LE          Therapeutic Exercises (CPT 75555):     1. NuStep, B LE, L3 10 minutes    2. Sit to stand, HEP    3. Straight leg raise, HEP    4. Bridges, 20x    5. Clamshell, 20x L only    6. LAQ, 20x each    7. Standing marches, HEP    8. Standing hip abduction, HEP    9. Shuttle LE press - DL 7 cords, 3x10    10. Shuttle LE press - SL 5 cords, 2x10    20. POC: 07/16/2021 to 09/03/21      Therapeutic Exercise Summary: Pt educated to continue with HEP.     Therapeutic Treatments and Modalities:     1. Gait Training (CPT 74501), see below    Therapeutic Treatment and Modalities Summary: Gait Belt donned during overground walking and balance  -Gait training overground with SPC 1x500 ft. Visual and verbal cueing for gait mechanics with SPC.     Time-based treatments/modalities:    Physical Therapy Timed Treatment Charges  Gait training minutes (CPT 97639): 8 minutes  Therapeutic exercise minutes (CPT 46220): 30 minutes  ASSESSMENT:   Response to treatment: The pt continues to tolerate a progression in ambulation distance with SPC with less reports of fatigue and no reports of discomfort. She does continue to present with antalgic gait mechanics likely secondary to her L knee OA. She is tolerating progression  of LE strengthening program well with steady improvements in her activity and exercise tolerance. The pt will continue to benefit from skilled physical therapy intervention to maximize her return to PLOF.     PLAN/RECOMMENDATIONS:   Plan for treatment: therapy treatment to continue next visit.  Planned interventions for next visit: continue with current treatment. Continue with progression of functional strengthening and balance program.

## 2021-08-18 ENCOUNTER — PHYSICAL THERAPY (OUTPATIENT)
Dept: PHYSICAL THERAPY | Facility: REHABILITATION | Age: 81
End: 2021-08-18
Attending: ORTHOPAEDIC SURGERY
Payer: MEDICARE

## 2021-08-18 DIAGNOSIS — M16.12 UNILATERAL PRIMARY OSTEOARTHRITIS, LEFT HIP: ICD-10-CM

## 2021-08-18 DIAGNOSIS — Z96.642 PRESENCE OF LEFT ARTIFICIAL HIP JOINT: ICD-10-CM

## 2021-08-18 DIAGNOSIS — M25.552 PAIN IN LEFT HIP: ICD-10-CM

## 2021-08-18 PROCEDURE — 97116 GAIT TRAINING THERAPY: CPT

## 2021-08-18 PROCEDURE — 97110 THERAPEUTIC EXERCISES: CPT

## 2021-08-18 NOTE — OP THERAPY DAILY TREATMENT
Outpatient Physical Therapy  DAILY TREATMENT     [facility] Physical Therapy [address]  [address]  [place] [address]  Phone:  [phone]  Fax:  [phone]    Date: 08/18/2021    Patient: Pamela Monaco  YOB: 1940  MRN: [id number]     Time Calculation    Start time: 1447  Stop time: 1525 Time Calculation (min): 38 minutes         Chief Complaint: Difficulty Walking, Hip Problem, and Knee Problem    Visit #: 14    SUBJECTIVE:  The pt reports that her knee and hip have been doing better. She is ambulating with quad cane and is no longer using the cane. The pt is doing exercises at home, mainly walking. Average L hip pain 5/10, L knee pain 5/10    OBJECTIVE:  Current objective measures: See progress note          Therapeutic Exercises (CPT 88666):     1. NuStep, B LE, L3 10 minutes    2. Sit to stand, 2x10    3. Straight leg raise, HEP    4. Bridges, 20x    5. Clamshell, Not today    6. LAQ, 20x each, Pt continues to be unable to perform full ROM with L LAQ    7. Supine marching, 20x    8. Standing hip abduction, Not today    9. Shuttle LE press - DL 7 cords, Not today    10. Shuttle LE press - SL 5 cords, Not today      Therapeutic Exercise Summary: Pt educated to perform 10 repetitions of sit to stand every day. Pt and daughter expressed understanding.     Therapeutic Treatments and Modalities:     1. Gait Training (CPT 26376), see below    Therapeutic Treatment and Modalities Summary: -Gait training overground with SPC 1x250 ft. Visual and verbal cueing for gait mechanics with SPC.   -Overground walking without AD with mirror cue to perform symmetrical ambulation and prevent limp  -Lateral walking with mirror cue, 3 laps, cueing to maintain neutral foot posture  -Posterior walking with mirror cue, 3 laps, supervision    Time-based treatments/modalities:    Physical Therapy Timed Treatment Charges  Gait training minutes (CPT 18971): 15 minutes  Therapeutic exercise minutes  (CPT 54511): 23 minutes    ASSESSMENT:   Response to treatment: See progress note    PLAN/RECOMMENDATIONS:   Plan for treatment: therapy treatment to continue next visit.  Planned interventions for next visit: continue with current treatment.

## 2021-08-18 NOTE — OP THERAPY PROGRESS SUMMARY
Outpatient Physical Therapy  PROGRESS SUMMARY NOTE      [facility] Physical Therapy [address].  [address]  [place] [address]  Phone:  [phone]  Fax:  [phone]    Date of Visit: 2021    Patient: Pamela Monaco  YOB: 1940  MRN: [id number]     Referring Provider: Sánchez Ortez M.D.  [address] [place] [address]   Referring Diagnosis Presence of left artificial hip joint [Z96.642]     Visit Diagnoses     ICD-10-CM   1. Presence of left artificial hip joint  Z96.642   2. Pain in left hip  M25.552   3. Unilateral primary osteoarthritis, left hip  M16.12       Rehab Potential: good    Progress Report Period: 21-08          Objective Findings and Assessment:   Patient progression towards goals: Pamela Monaco presents for skilled physical therapist progress update following 5 PT visits since her last progress update for L hip and knee pain and changes in gait and mobility. In the last month the pt has made improvements in dynamic mobility, walking speed, hip strength, balance confidence, and is no longer using a FWW during community mobility. She continues to have limitations in gait mechanics, LE strength, walking endurance, and independence with IADLs. The pt has met or nearly met 6/8 of her PT goals with progress towards remaining goals. Based on the pt's improvements since the start of care but remaining limitations detailed above, the pt will continue to benefit from skilled physical therapy intervention to maximize her CLOF, independence, and safety.     Objective findings and assessment details: TU.46 no AD  10MWT: 16.03  Gait speed: 0.63 m/s  Hip abduction: 4-/5    Goals:   Short Term Goals:   1. Pt will demonstrate TUG time to <30 seconds with LRAD to indicate improved dynamic mobility (MET)  2. Pt will report 50% improvement in her sleeping tolerance (Met regarding pain, not met regarding other reasons)  3. Pt will be able to  demonstrate L hip abduction MMT to 4/5 (Nearly met, continue)  4. Pt will demonstrate self selected gait speed to at least 0.7 m/s (Nearly met, continue)      Long Term Goals:    1. Pt will be able to ascend a curb using her L LE with single handrail Mod I (MET)  2. Pt will demonstrate TUG to <13.5 sec to indicate decreased fall risk (In progress, continue)  3. Pt will be score at least 45/56 on the Levin Balance scale to indicate low fall risk (Not tested today)  4. Pt will be independent with HEP (MET)       Plan:   Planned therapy interventions:  Gait Training (CPT 74542), Therapeutic Exercise (CPT 31745) and Neuromuscular Re-education (CPT 57243)  Frequency:  2x week  Duration in weeks:  2  Duration in visits:  4  Plan details:  Continue with progression of gait and balance training and independence with HEP.       Referring provider co-signature:  I have reviewed this plan of care and my co-signature certifies the need for services.     Certification Period: 08/18/2021 to 09/08/21    Physician Signature: ________________________________ Date: ______________

## 2021-08-23 ENCOUNTER — PHYSICAL THERAPY (OUTPATIENT)
Dept: PHYSICAL THERAPY | Facility: REHABILITATION | Age: 81
End: 2021-08-23
Attending: ORTHOPAEDIC SURGERY
Payer: MEDICARE

## 2021-08-23 DIAGNOSIS — M25.552 PAIN IN LEFT HIP: ICD-10-CM

## 2021-08-23 DIAGNOSIS — Z96.642 PRESENCE OF LEFT ARTIFICIAL HIP JOINT: ICD-10-CM

## 2021-08-23 PROCEDURE — 97110 THERAPEUTIC EXERCISES: CPT

## 2021-08-23 PROCEDURE — 97116 GAIT TRAINING THERAPY: CPT

## 2021-08-23 NOTE — OP THERAPY DAILY TREATMENT
"  Outpatient Physical Therapy  DAILY TREATMENT     [facility] Physical Therapy [address].  [address]  [place] [address]  Phone:  [phone]  Fax:  [phone]    Date: 08/23/2021    Patient: Pamela Monaco  YOB: 1940  MRN: [id number]     Time Calculation    Start time: 1450  Stop time: 1530 Time Calculation (min): 40 minutes         Chief Complaint: Hip Problem, Difficulty Walking, and Knee Problem    Visit #: 15    SUBJECTIVE:  The pt states that she continues to feel stronger with ambulation with the quad cane. She is present with her son and is agreeable to PT.     OBJECTIVE:  Current objective measures: Trendelenburg on the L in stance phase          Therapeutic Exercises (CPT 88438):     1. NuStep, B LE, L3 10 minutes    2. Sit to stand, 2x10    3. Straight leg raise, HEP    4. Bridges, Not today    5. Clamshell, Not today    6. LAQ, Not today    7. Seated, 20x    8. Standing hip abduction, Not today    9. Step ups anterior, 6\" 15x with B to U UE assist    10. Step ups lateral, 6\" 15x with B to U UE assist    20. POC: 08/18/2021 to 09/08/21      Therapeutic Exercise Summary: Pt educated to perform 10 repetitions of sit to stand and lateral walks every day.     Therapeutic Treatments and Modalities:     1. Gait Training (CPT 55555), see below    Therapeutic Treatment and Modalities Summary: -Gait training overground with SPC 3x200 ft. Visual and verbal cueing for gait mechanics with SPC.   -Lateral walking with mirror cue, 2 laps    Time-based treatments/modalities:    Physical Therapy Timed Treatment Charges  Gait training minutes (CPT 38230): 9 minutes  Therapeutic exercise minutes (CPT 26721): 30 minutes  ASSESSMENT:   Response to treatment: Pamela continues to demonstrate improved safety with ambulation with the SPC but continues to have abnormal gait mechanics, with noted trendelenburg on the L in stance. She was challenged by added step up task in the forward and " lateral direction, requiring Mod UE assist at the bars for support. She has good family support for HEP and will benefit from ongoing skilled therapy to maximize her CLOF, independence, and safety.     PLAN/RECOMMENDATIONS:   Plan for treatment: therapy treatment to continue next visit.  Planned interventions for next visit: continue with current treatment. Continue with progression of gait mechanics and functional strengthening.

## 2021-08-25 ENCOUNTER — PHYSICAL THERAPY (OUTPATIENT)
Dept: PHYSICAL THERAPY | Facility: REHABILITATION | Age: 81
End: 2021-08-25
Attending: ORTHOPAEDIC SURGERY
Payer: MEDICARE

## 2021-08-25 DIAGNOSIS — M25.552 PAIN IN LEFT HIP: ICD-10-CM

## 2021-08-25 DIAGNOSIS — Z96.642 PRESENCE OF LEFT ARTIFICIAL HIP JOINT: ICD-10-CM

## 2021-08-25 PROCEDURE — 97116 GAIT TRAINING THERAPY: CPT

## 2021-08-25 PROCEDURE — 97110 THERAPEUTIC EXERCISES: CPT

## 2021-08-25 NOTE — OP THERAPY DAILY TREATMENT
Outpatient Physical Therapy  DAILY TREATMENT     [facility] Physical Therapy [address].  [address]  [place] [address]  Phone:  [phone]  Fax:  [phone]    Date: 08/25/2021    Patient: Pamela Monaco  YOB: 1940  MRN: [id number]     Time Calculation    Start time: 1448  Stop time: 1526 Time Calculation (min): 38 minutes         Chief Complaint: Hip Problem, Difficulty Walking, and Knee Problem    Visit #: 16    SUBJECTIVE:  The pt reports that her hip is feeling good and she has only minor pain in her knee. She is ambulating with quad cane and is agreeable to PT.     OBJECTIVE:  Current objective measures: Vitals post NuStep: 82 bpm, 96% O2          Therapeutic Exercises (CPT 01441):     1. NuStep, B LE, L3 10 minutes    2. Sit to stand, 2x10    3. Straight leg raise, 2x10    4. Bridges, 2x10    5. Clamshell, Not today    6. LAQ, 15x each     8. Standing hip abduction, Not today    9. Step ups anterior, Not today    10. Step ups lateral, Not today    20. POC: 08/18/2021 to 09/08/21      Therapeutic Exercise Summary: Pt educated to perform 10 repetitions of sit to stand and lateral walks every day.     Therapeutic Treatments and Modalities:     1. Gait Training (CPT 94345), see below    Therapeutic Treatment and Modalities Summary: -Gait training overground with SPC timed for speed cueing  -1st round: 2.08 minutes, 220 ft.  -2nd round: 1.46 minutes, 220 ft.   -3rd round: 1:30 minutes, 220 ft.         Time-based treatments/modalities:    Physical Therapy Timed Treatment Charges  Gait training minutes (CPT 79998): 8 minutes  Therapeutic exercise minutes (CPT 23362): 30 minutes    ASSESSMENT:   Response to treatment: Today's session focused on ambulation with SPC with a focus on increasing speed during overground walking. The pt demonstrated improved gait mechanics at faster speeds, though she did fatigue quickly. She continues to be limited with L quad and hip abductor  strength but is demonstrating improved functional mobility. Anticipate transition to independent HEP after next visit.    PLAN/RECOMMENDATIONS:   Plan for treatment: therapy treatment to continue next visit.  Planned interventions for next visit: continue with current treatment. Anticipate transition to independent HEP after next session.

## 2021-08-31 ENCOUNTER — TELEPHONE (OUTPATIENT)
Dept: PHYSICAL THERAPY | Facility: REHABILITATION | Age: 81
End: 2021-08-31

## 2021-08-31 NOTE — OP THERAPY DISCHARGE SUMMARY
Outpatient Physical Therapy  DISCHARGE SUMMARY NOTE      [place] [address] [facility]  [address]  [place] [address]  Phone:  [phone]  Fax:  [phone]    Date of Visit: 08/31/2021    Patient: Pamela Monaco  YOB: 1940  MRN: [id number]     Referring Provider:  Sánchez Ortez M.D.  [address] [place] [address]      Referring Diagnosis Presence of left artificial hip joint [Z96.642]             Your patient is being discharged from Physical Therapy with the following comments:   · Goals partially met    Comments:  Pamela Monaco was seen for 16 PT visits between 6/14/21-8/25/21 for changes in gait, balance, mobility and hip pain s/p L CORETTA. The pt made overall improvements in dynamic mobility, sleeping tolerance, LE strength, gait mechanics, use of SPC rather than FWW, and independence with IADLs. The pt made progress towards all initially established PT goals with achievement of ongoing goals possible through ongoing participation in independent HEP. Based on the pt's improvements since the start of care, she will benefit from discharge from skilled therapy services at this time.     Limitations Remaining from progress note on 8/18/21  1. Pt will demonstrate TUG time to <30 seconds with LRAD to indicate improved dynamic mobility (MET)  2. Pt will report 50% improvement in her sleeping tolerance (Met regarding pain, not met regarding other reasons)  3. Pt will be able to demonstrate L hip abduction MMT to 4/5 (Nearly met, continue)  4. Pt will demonstrate self selected gait speed to at least 0.7 m/s (Nearly met, continue)      Long Term Goals:    1. Pt will be able to ascend a curb using her L LE with single handrail Mod I (MET)  2. Pt will demonstrate TUG to <13.5 sec to indicate decreased fall risk (In progress, continue)  3. Pt will be score at least 45/56 on the Levin Balance scale to indicate low fall risk (Not tested today)  4. Pt will be independent  with HEP (MET)     Recommendations:  It is recommended that the pt continue with participation in independent HEP including walking program, sit to stand, and lateral walks to maintain functional gains made during PT intervention. If further PT intervention is needed, recommend that pt follow up with her physician for a new PT referral.     Marquita Shelton, PT    Date: 8/31/2021

## 2021-12-09 PROBLEM — R63.0 LOSS OF APPETITE: Status: ACTIVE | Noted: 2021-05-21

## 2021-12-09 PROBLEM — Z00.00 PREVENTATIVE HEALTH CARE: Status: ACTIVE | Noted: 2020-09-02

## 2021-12-09 PROBLEM — M16.12 UNILATERAL PRIMARY OSTEOARTHRITIS, LEFT HIP: Status: ACTIVE | Noted: 2020-10-06

## 2021-12-09 PROBLEM — N18.31 STAGE 3A CHRONIC KIDNEY DISEASE: Status: ACTIVE | Noted: 2021-03-30

## 2021-12-09 PROBLEM — Z91.89 AT RISK FOR POLYPHARMACY: Status: ACTIVE | Noted: 2020-09-02

## 2021-12-09 PROBLEM — E11.9 TYPE 2 DIABETES MELLITUS WITHOUT COMPLICATIONS (HCC): Status: ACTIVE | Noted: 2020-09-02

## 2021-12-09 PROBLEM — R32 URINARY INCONTINENCE: Status: ACTIVE | Noted: 2020-09-14

## 2021-12-09 PROBLEM — E03.9 HYPOTHYROIDISM: Status: ACTIVE | Noted: 2020-09-02

## 2021-12-09 PROBLEM — I10 HYPERTENSION: Status: ACTIVE | Noted: 2020-09-02

## 2021-12-09 PROBLEM — R41.81 AGE-RELATED COGNITIVE DECLINE: Status: ACTIVE | Noted: 2020-09-02

## 2021-12-09 PROBLEM — G47.00 INSOMNIA: Status: ACTIVE | Noted: 2021-02-23

## 2021-12-09 PROBLEM — E78.5 HYPERLIPIDEMIA: Status: ACTIVE | Noted: 2020-09-26

## 2021-12-09 PROBLEM — F32.1 MODERATE MAJOR DEPRESSION (HCC): Status: ACTIVE | Noted: 2020-09-02

## 2021-12-09 PROBLEM — L21.9 SEBORRHEIC DERMATITIS OF SCALP: Status: ACTIVE | Noted: 2020-10-06

## 2021-12-09 RX ORDER — BLOOD SUGAR DIAGNOSTIC
1 STRIP MISCELLANEOUS 2 TIMES DAILY
COMMUNITY
Start: 2021-05-21 | End: 2022-02-07 | Stop reason: SDUPTHER

## 2021-12-09 RX ORDER — MV-MIN/FOLIC/VIT K/LYCOP/COQ10 200-100MCG
1 CAPSULE ORAL DAILY
COMMUNITY
Start: 2021-08-23

## 2021-12-09 RX ORDER — MIRTAZAPINE 15 MG/1
TABLET, FILM COATED ORAL
Qty: 90 TABLET | Refills: 1 | Status: SHIPPED | OUTPATIENT
Start: 2021-12-09 | End: 2021-12-27 | Stop reason: SDUPTHER

## 2021-12-09 NOTE — TELEPHONE ENCOUNTER
Last seen: 08/23/21 by Dr. George  Next appt: None     Was the patient seen in the last year in this department? Yes   Does patient have an active prescription for medications requested? No   Received Request Via: Pharmacy

## 2021-12-27 NOTE — TELEPHONE ENCOUNTER
Last seen: 08/23/2021 by Dr. george  Next appt: 01/10/2022 with Dr. George    Was the patient seen in the last year in this department? Yes   Does patient have an active prescription for medications requested? No   Received Request Via: Pts daughter called requesting refill of all medications

## 2021-12-28 NOTE — TELEPHONE ENCOUNTER
Pts son Lloyd called requesting update on medication refill. Informed request was sent to Dr. George yesterday will get back to him as soon as medications are refilled

## 2021-12-29 RX ORDER — MIRTAZAPINE 15 MG/1
15 TABLET, FILM COATED ORAL
Qty: 90 TABLET | Refills: 1 | Status: SHIPPED | OUTPATIENT
Start: 2021-12-29

## 2021-12-29 RX ORDER — LEVOTHYROXINE SODIUM 0.07 MG/1
75 TABLET ORAL
Qty: 90 TABLET | Refills: 1 | Status: SHIPPED | OUTPATIENT
Start: 2021-12-29

## 2021-12-29 RX ORDER — ATORVASTATIN CALCIUM 40 MG/1
40 TABLET, FILM COATED ORAL DAILY
Qty: 90 TABLET | Refills: 1 | Status: SHIPPED | OUTPATIENT
Start: 2021-12-29

## 2021-12-29 RX ORDER — ENALAPRIL MALEATE 20 MG/1
20 TABLET ORAL DAILY
Qty: 90 TABLET | Refills: 1 | Status: SHIPPED | OUTPATIENT
Start: 2021-12-29

## 2022-01-10 ENCOUNTER — APPOINTMENT (OUTPATIENT)
Dept: INTERNAL MEDICINE | Facility: OTHER | Age: 82
End: 2022-01-10
Payer: MEDICARE

## 2022-01-31 ENCOUNTER — OFFICE VISIT (OUTPATIENT)
Dept: INTERNAL MEDICINE | Facility: OTHER | Age: 82
End: 2022-01-31
Payer: MEDICARE

## 2022-01-31 ENCOUNTER — HOSPITAL ENCOUNTER (OUTPATIENT)
Dept: LAB | Facility: MEDICAL CENTER | Age: 82
End: 2022-01-31
Attending: STUDENT IN AN ORGANIZED HEALTH CARE EDUCATION/TRAINING PROGRAM
Payer: MEDICARE

## 2022-01-31 VITALS
DIASTOLIC BLOOD PRESSURE: 74 MMHG | HEART RATE: 73 BPM | OXYGEN SATURATION: 94 % | TEMPERATURE: 97.2 F | BODY MASS INDEX: 28.25 KG/M2 | SYSTOLIC BLOOD PRESSURE: 117 MMHG | WEIGHT: 149.6 LBS | HEIGHT: 61 IN

## 2022-01-31 DIAGNOSIS — F32.1 MODERATE MAJOR DEPRESSION (HCC): ICD-10-CM

## 2022-01-31 DIAGNOSIS — E11.9 TYPE 2 DIABETES MELLITUS WITHOUT COMPLICATION, WITH LONG-TERM CURRENT USE OF INSULIN (HCC): ICD-10-CM

## 2022-01-31 DIAGNOSIS — E03.9 HYPOTHYROIDISM, UNSPECIFIED TYPE: ICD-10-CM

## 2022-01-31 DIAGNOSIS — N18.31 STAGE 3A CHRONIC KIDNEY DISEASE: ICD-10-CM

## 2022-01-31 DIAGNOSIS — I10 HYPERTENSION, UNSPECIFIED TYPE: ICD-10-CM

## 2022-01-31 DIAGNOSIS — Z79.4 TYPE 2 DIABETES MELLITUS WITHOUT COMPLICATION, WITH LONG-TERM CURRENT USE OF INSULIN (HCC): ICD-10-CM

## 2022-01-31 DIAGNOSIS — Z00.00 PREVENTATIVE HEALTH CARE: ICD-10-CM

## 2022-01-31 DIAGNOSIS — G47.00 INSOMNIA, UNSPECIFIED TYPE: ICD-10-CM

## 2022-01-31 DIAGNOSIS — R63.0 LOSS OF APPETITE: ICD-10-CM

## 2022-01-31 LAB
25(OH)D3 SERPL-MCNC: 38 NG/ML (ref 30–100)
ALBUMIN SERPL BCP-MCNC: 3.9 G/DL (ref 3.2–4.9)
ALBUMIN/GLOB SERPL: 1.2 G/DL
ALP SERPL-CCNC: 141 U/L (ref 30–99)
ALT SERPL-CCNC: 8 U/L (ref 2–50)
ANION GAP SERPL CALC-SCNC: 12 MMOL/L (ref 7–16)
AST SERPL-CCNC: 12 U/L (ref 12–45)
BASOPHILS # BLD AUTO: 0.3 % (ref 0–1.8)
BASOPHILS # BLD: 0.03 K/UL (ref 0–0.12)
BILIRUB SERPL-MCNC: 0.2 MG/DL (ref 0.1–1.5)
BUN SERPL-MCNC: 18 MG/DL (ref 8–22)
CALCIUM SERPL-MCNC: 10 MG/DL (ref 8.5–10.5)
CHLORIDE SERPL-SCNC: 104 MMOL/L (ref 96–112)
CO2 SERPL-SCNC: 23 MMOL/L (ref 20–33)
CREAT SERPL-MCNC: 0.89 MG/DL (ref 0.5–1.4)
EOSINOPHIL # BLD AUTO: 0.23 K/UL (ref 0–0.51)
EOSINOPHIL NFR BLD: 2.6 % (ref 0–6.9)
ERYTHROCYTE [DISTWIDTH] IN BLOOD BY AUTOMATED COUNT: 48.1 FL (ref 35.9–50)
EST. AVERAGE GLUCOSE BLD GHB EST-MCNC: 166 MG/DL
GLOBULIN SER CALC-MCNC: 3.3 G/DL (ref 1.9–3.5)
GLUCOSE SERPL-MCNC: 100 MG/DL (ref 65–99)
HBA1C MFR BLD: 7.4 % (ref 4–5.6)
HCT VFR BLD AUTO: 39.5 % (ref 37–47)
HGB BLD-MCNC: 12.4 G/DL (ref 12–16)
IMM GRANULOCYTES # BLD AUTO: 0.03 K/UL (ref 0–0.11)
IMM GRANULOCYTES NFR BLD AUTO: 0.3 % (ref 0–0.9)
LYMPHOCYTES # BLD AUTO: 4.05 K/UL (ref 1–4.8)
LYMPHOCYTES NFR BLD: 46.6 % (ref 22–41)
MCH RBC QN AUTO: 28 PG (ref 27–33)
MCHC RBC AUTO-ENTMCNC: 31.4 G/DL (ref 33.6–35)
MCV RBC AUTO: 89.2 FL (ref 81.4–97.8)
MONOCYTES # BLD AUTO: 0.42 K/UL (ref 0–0.85)
MONOCYTES NFR BLD AUTO: 4.8 % (ref 0–13.4)
NEUTROPHILS # BLD AUTO: 3.94 K/UL (ref 2–7.15)
NEUTROPHILS NFR BLD: 45.4 % (ref 44–72)
NRBC # BLD AUTO: 0 K/UL
NRBC BLD-RTO: 0 /100 WBC
PLATELET # BLD AUTO: 365 K/UL (ref 164–446)
PMV BLD AUTO: 9.9 FL (ref 9–12.9)
POTASSIUM SERPL-SCNC: 4.2 MMOL/L (ref 3.6–5.5)
PROT SERPL-MCNC: 7.2 G/DL (ref 6–8.2)
RBC # BLD AUTO: 4.43 M/UL (ref 4.2–5.4)
SODIUM SERPL-SCNC: 139 MMOL/L (ref 135–145)
TSH SERPL DL<=0.005 MIU/L-ACNC: 1.61 UIU/ML (ref 0.38–5.33)
WBC # BLD AUTO: 8.7 K/UL (ref 4.8–10.8)

## 2022-01-31 PROCEDURE — 80053 COMPREHEN METABOLIC PANEL: CPT

## 2022-01-31 PROCEDURE — 36415 COLL VENOUS BLD VENIPUNCTURE: CPT | Mod: GA

## 2022-01-31 PROCEDURE — 83036 HEMOGLOBIN GLYCOSYLATED A1C: CPT | Mod: GA

## 2022-01-31 PROCEDURE — 85025 COMPLETE CBC W/AUTO DIFF WBC: CPT

## 2022-01-31 PROCEDURE — 99213 OFFICE O/P EST LOW 20 MIN: CPT | Mod: GE | Performed by: STUDENT IN AN ORGANIZED HEALTH CARE EDUCATION/TRAINING PROGRAM

## 2022-01-31 PROCEDURE — 82306 VITAMIN D 25 HYDROXY: CPT

## 2022-01-31 PROCEDURE — 84443 ASSAY THYROID STIM HORMONE: CPT

## 2022-01-31 RX ORDER — ONDANSETRON 4 MG/1
4 TABLET, ORALLY DISINTEGRATING ORAL EVERY 6 HOURS PRN
COMMUNITY

## 2022-01-31 RX ORDER — TRAZODONE HYDROCHLORIDE 100 MG/1
100 TABLET ORAL NIGHTLY
COMMUNITY

## 2022-01-31 ASSESSMENT — PATIENT HEALTH QUESTIONNAIRE - PHQ9
6. FEELING BAD ABOUT YOURSELF - OR THAT YOU ARE A FAILURE OR HAVE LET YOURSELF OR YOUR FAMILY DOWN: NOT AL ALL
9. THOUGHTS THAT YOU WOULD BE BETTER OFF DEAD, OR OF HURTING YOURSELF: NOT AT ALL
3. TROUBLE FALLING OR STAYING ASLEEP OR SLEEPING TOO MUCH: NOT AT ALL
8. MOVING OR SPEAKING SO SLOWLY THAT OTHER PEOPLE COULD HAVE NOTICED. OR THE OPPOSITE, BEING SO FIGETY OR RESTLESS THAT YOU HAVE BEEN MOVING AROUND A LOT MORE THAN USUAL: NOT AT ALL
1. LITTLE INTEREST OR PLEASURE IN DOING THINGS: NOT AT ALL
SUM OF ALL RESPONSES TO PHQ9 QUESTIONS 1 AND 2: 0
7. TROUBLE CONCENTRATING ON THINGS, SUCH AS READING THE NEWSPAPER OR WATCHING TELEVISION: NOT AT ALL
4. FEELING TIRED OR HAVING LITTLE ENERGY: NOT AT ALL
2. FEELING DOWN, DEPRESSED, IRRITABLE, OR HOPELESS: NOT AT ALL
SUM OF ALL RESPONSES TO PHQ QUESTIONS 1-9: 0
5. POOR APPETITE OR OVEREATING: NOT AT ALL

## 2022-01-31 NOTE — PATIENT INSTRUCTIONS
Please get the blood work done today.    Continue taking the current medications for now. Follow up with Dr. George in 2 months.

## 2022-02-03 NOTE — PROGRESS NOTES
Established Patient    Chief Complaint   Patient presents with   • Medication Refill       HPI: Pamela Monaco is a 82 y.o. female who presented to the clinic for follow up visit.    She informs leaving to [place] in 2 weeks and plans to stay there for about 2 months and wants to make sure she is up to date with all the medications.    Type 2 DM - Currently on Metformin 500mg BID with last A1c from 8/2021 at 7.9%. Compliant in taking medications, does not check her sugars daily.    Hypertension - Taking on Enalapril 20mg, BP today in clinic 117/74. Denies any symptoms of chest pain, shortness of breath, headache, vision changes.    Hypothyroidism - Currently on synthyroid 75mcg with last TSH at 2.470 from 2/2021. Denies any symptoms of weight changes, irritability, cold intolerance.    Insomnia - On trazodone 100mg each night.    Mood changes - Taking Remeron, which is also helping with her appetite.     Her history is significant for Left Hip OA s/p replacement in 4/2021, currently using walker and recovering well. Denies any other acute complaints at this visit              Patient Active Problem List    Diagnosis Date Noted   • Loss of appetite 05/21/2021   • History of hip replacement 04/10/2021   • Stage 3a chronic kidney disease (HCC) 03/30/2021   • Insomnia 02/23/2021   • Seborrheic dermatitis of scalp 10/06/2020   • Unilateral primary osteoarthritis, left hip 10/06/2020   • Hyperlipidemia 09/26/2020   • Urinary incontinence 09/14/2020   • Age-related cognitive decline 09/02/2020   • At risk for polypharmacy 09/02/2020   • Hypertension 09/02/2020   • Hypothyroidism 09/02/2020   • Moderate major depression (HCC) 09/02/2020   • Health care maintenance 09/02/2020   • Type 2 diabetes mellitus without complications (HCC) 09/02/2020   • Primary osteoarthritis of right knee 03/13/2017       Current Outpatient Medications   Medication Sig Dispense Refill   • ondansetron (ZOFRAN ODT) 4 MG TABLET  "DISPERSIBLE Take 4 mg by mouth every 6 hours as needed for Nausea.     • traZODone (DESYREL) 100 MG Tab Take 100 mg by mouth every evening.     • metFORMIN (GLUCOPHAGE) 500 MG Tab Take 1 tab po twice daily 180 Tablet 0   • levothyroxine (SYNTHROID) 75 MCG Tab Take 1 Tablet by mouth every morning on an empty stomach. 90 Tablet 1   • enalapril (VASOTEC) 20 MG tablet Take 1 Tablet by mouth every day. 90 Tablet 1   • mirtazapine (REMERON) 15 MG Tab Take 1 Tablet by mouth every day. 90 Tablet 1   • atorvastatin (LIPITOR) 40 MG Tab Take 1 Tablet by mouth every day. 90 Tablet 1   • glucose blood (ONETOUCH VERIO) strip 1 Strip by Other route 2 times a day.     • Multiple Vitamins-Minerals (DAILY MULTIVITAMIN) Cap Take 1 Capsule by mouth every day.     • acetaminophen (8 HOUR PAIN RELIEVER) 650 MG CR tablet Take 650 mg by mouth every 6 hours as needed. Indications: Pain     • calcium carbonate (OS-LESLEY 500) 1250 (500 Ca) MG Tab Take 500 mg by mouth every day.     • aspirin EC (ECOTRIN) 81 MG Tablet Delayed Response Take 81 mg by mouth every day.       No current facility-administered medications for this visit.       ROS: As per HPI. Additional pertinent systems as noted below.  Constitutional:  Negative for fever, chills   HENT:  Negative for ear pain, sore throat, runny nose   Eyes:  Negative for blurred vision and double vision   Cardiovascular:  Negative for chest pain, palpitations   Respiratory:  Negative for cough, sputum production, shortness of breath   Gastrointestinal: Negative for abdominal pain, nausea, vomiting, diarrhea, or blood in stools     /74 (BP Location: Left arm, Patient Position: Sitting, BP Cuff Size: Adult)   Pulse 73   Temp 36.2 °C (97.2 °F) (Temporal)   Ht 1.549 m (5' 1\")   Wt 67.9 kg (149 lb 9.6 oz)   SpO2 94%   BMI 28.27 kg/m²     Physical Exam   Constitutional:  Well developed, well nourished. Not in acute distress   HENT:  Normocephalic, Atraumatic, Oropharynx is pink and moist. No " oral exudates, Nose normal   Eyes:  EOMI, Conjunctiva normal, No discharge. PERRLA   Cardiovascular:  Regular rate and rhythm, No pedal edema, Intact distal pulses   Respiratory: Clear to auscultation bilaterally, No use of accessory muscles   Gastrointestinal: Bowel sounds normal, Soft, No tenderness, No palpable masses/hepatosplenomegaly     Note: I have reviewed all pertinent labs and diagnostic tests associated with this visit with specific comments listed under the assessment and plan below    Assessment and Plan  Hypertension  BP - 117/74 at visit  Currently on Enalapril 20mg and compliant  Plan:  - Continue medication      Type 2 diabetes mellitus without complications (HCC)  A1c - 7.9% from 8/2021  Metformin increased to 500mg BID post above lab result  Denies any neuropathy symptoms  Plan:  - Counselled pt. to check her sugars and bring a log at follow up visit  - Check A1c  - Continue metformin at current dosage pending lab result      Insomnia  Continue Trazodone    Hypothyroidism  Last TSH at 2.470 from 2/2021  Currently on synthyroid 75mcg and compliant  Plan:  - Check TSH    Moderate major depression (HCC)  Continue Mirtazapine    Loss of appetite  Continue Mirtazapine    Health care maintenance  Received 3 doses of COVID vaccine  Check labs CBC, CMP, A1c, TSH and Vit. D      Followup: Return in about 2 months (around 3/31/2022).    Patient discussed with attending.    Signed by: Flaquita Garcia M.D.    Please note that this dictation was created using voice recognition software. I have made every reasonable attempt to correct obvious errors, but I expect that there are errors of grammar and possibly content that I did not discover before finalizing the note.

## 2022-02-03 NOTE — ASSESSMENT & PLAN NOTE
A1c - 7.9% from 8/2021  Metformin increased to 500mg BID post above lab result  Denies any neuropathy symptoms  Plan:  - Counselled pt. to check her sugars and bring a log at follow up visit  - Check A1c  - Continue metformin at current dosage pending lab result

## 2022-02-07 DIAGNOSIS — E11.9 TYPE 2 DIABETES MELLITUS WITHOUT COMPLICATION, WITHOUT LONG-TERM CURRENT USE OF INSULIN (HCC): ICD-10-CM

## 2022-02-07 RX ORDER — BLOOD SUGAR DIAGNOSTIC
STRIP MISCELLANEOUS
Qty: 300 STRIP | Refills: 1 | Status: SHIPPED | OUTPATIENT
Start: 2022-02-07

## 2022-02-07 NOTE — TELEPHONE ENCOUNTER
Last seen: 01/31/22 by Dr. Garcia  Next appt: 04/25/22 with Dr. George    Was the patient seen in the last year in this department? Yes   Does patient have an active prescription for medications requested? No   Received Request Via: Pharmacy

## 2022-04-25 ENCOUNTER — APPOINTMENT (OUTPATIENT)
Dept: INTERNAL MEDICINE | Facility: OTHER | Age: 82
End: 2022-04-25
Payer: MEDICARE

## 2022-07-20 ENCOUNTER — HOSPITAL ENCOUNTER (OUTPATIENT)
Dept: LAB | Facility: MEDICAL CENTER | Age: 82
End: 2022-07-20
Attending: FAMILY MEDICINE
Payer: MEDICARE

## 2022-07-20 LAB
CHOLEST SERPL-MCNC: 110 MG/DL (ref 100–199)
EST. AVERAGE GLUCOSE BLD GHB EST-MCNC: 143 MG/DL
HBA1C MFR BLD: 6.6 % (ref 4–5.6)
HDLC SERPL-MCNC: 49 MG/DL
LDLC SERPL CALC-MCNC: 41 MG/DL
T4 FREE SERPL-MCNC: 1.15 NG/DL (ref 0.93–1.7)
TRIGL SERPL-MCNC: 102 MG/DL (ref 0–149)
TSH SERPL DL<=0.005 MIU/L-ACNC: 1.18 UIU/ML (ref 0.38–5.33)
VIT B12 SERPL-MCNC: 443 PG/ML (ref 211–911)

## 2022-07-20 PROCEDURE — 84439 ASSAY OF FREE THYROXINE: CPT

## 2022-07-20 PROCEDURE — 83036 HEMOGLOBIN GLYCOSYLATED A1C: CPT | Mod: GA

## 2022-07-20 PROCEDURE — 84443 ASSAY THYROID STIM HORMONE: CPT

## 2022-07-20 PROCEDURE — 82607 VITAMIN B-12: CPT

## 2022-07-20 PROCEDURE — 80061 LIPID PANEL: CPT

## 2022-07-20 PROCEDURE — 36415 COLL VENOUS BLD VENIPUNCTURE: CPT

## 2022-11-24 ENCOUNTER — OFFICE VISIT (OUTPATIENT)
Dept: URGENT CARE | Facility: PHYSICIAN GROUP | Age: 82
End: 2022-11-24
Payer: MEDICARE

## 2022-11-24 ENCOUNTER — HOSPITAL ENCOUNTER (OUTPATIENT)
Dept: RADIOLOGY | Facility: MEDICAL CENTER | Age: 82
End: 2022-11-24
Attending: NURSE PRACTITIONER
Payer: MEDICARE

## 2022-11-24 VITALS
HEIGHT: 61 IN | WEIGHT: 157 LBS | SYSTOLIC BLOOD PRESSURE: 132 MMHG | BODY MASS INDEX: 29.64 KG/M2 | TEMPERATURE: 97.1 F | DIASTOLIC BLOOD PRESSURE: 74 MMHG | OXYGEN SATURATION: 94 % | RESPIRATION RATE: 20 BRPM | HEART RATE: 69 BPM

## 2022-11-24 DIAGNOSIS — R09.81 NASAL CONGESTION WITH RHINORRHEA: ICD-10-CM

## 2022-11-24 DIAGNOSIS — J34.89 NASAL CONGESTION WITH RHINORRHEA: ICD-10-CM

## 2022-11-24 DIAGNOSIS — R06.02 SHORTNESS OF BREATH: ICD-10-CM

## 2022-11-24 DIAGNOSIS — R05.1 ACUTE COUGH: ICD-10-CM

## 2022-11-24 LAB
EXTERNAL QUALITY CONTROL: NORMAL
FLUAV+FLUBV AG SPEC QL IA: NEGATIVE
INT CON NEG: NEGATIVE
INT CON NEG: NEGATIVE
INT CON POS: POSITIVE
INT CON POS: POSITIVE
SARS-COV+SARS-COV-2 AG RESP QL IA.RAPID: NEGATIVE

## 2022-11-24 PROCEDURE — 87804 INFLUENZA ASSAY W/OPTIC: CPT | Performed by: NURSE PRACTITIONER

## 2022-11-24 PROCEDURE — 99213 OFFICE O/P EST LOW 20 MIN: CPT | Mod: CS | Performed by: NURSE PRACTITIONER

## 2022-11-24 PROCEDURE — 87426 SARSCOV CORONAVIRUS AG IA: CPT | Performed by: NURSE PRACTITIONER

## 2022-11-24 PROCEDURE — 71046 X-RAY EXAM CHEST 2 VIEWS: CPT

## 2022-11-24 RX ORDER — ALBUTEROL SULFATE 90 UG/1
2 AEROSOL, METERED RESPIRATORY (INHALATION) EVERY 6 HOURS PRN
Qty: 8.5 G | Refills: 0 | Status: SHIPPED | OUTPATIENT
Start: 2022-11-24

## 2022-11-24 ASSESSMENT — ENCOUNTER SYMPTOMS
DIARRHEA: 0
ABDOMINAL PAIN: 0
VOMITING: 0
EYE REDNESS: 0
CHILLS: 0
HEADACHES: 0
FEVER: 0
DIZZINESS: 0
COUGH: 1
NAUSEA: 0
EYE DISCHARGE: 0
CONSTIPATION: 0
NECK PAIN: 0
WHEEZING: 1
CARDIOVASCULAR NEGATIVE: 1
SHORTNESS OF BREATH: 1
SORE THROAT: 0
MYALGIAS: 1
WEAKNESS: 0

## 2022-11-24 ASSESSMENT — FIBROSIS 4 INDEX: FIB4 SCORE: 0.93

## 2022-11-24 NOTE — PROGRESS NOTES
Subjective     Pamela Monaco is a 82 y.o. female who presents with Shortness of Breath (Nasal congestion, chest congestion, wheezing, daughter is worried about pneumonia, x 3-4 days)            Shortness of Breath  Associated symptoms include ear pain and wheezing. Pertinent negatives include no abdominal pain, fever, headaches, neck pain, rash, sore throat or vomiting. Daughter has brought in her mother for upper respiratory symptoms x4 days.  Patient is [place]-speaking and daughter is interpreting for her.  Experiencing shortness of breath, nasal congestion, chest congestion and wheezing, cough worse at night. Daughter has concern for pneumonia due to worse cough and wheeze.  Last seen by [facility] primary care anterior this year.  History of hypothyroidism, diabetes type 2, hypertension, stage III kidney disease.  Dates blood sugar glucoses at home have been slightly elevated to 200s in the last couple days.  Decreased appetite with no vomiting or diarrhea.  Taking over-the-counter TheraFlu, DayQuil and NyQuil, Gatorade.    PMH:  has a past medical history of Diabetes (HCC), Disorder of thyroid, High cholesterol, Hypertension, and Psychiatric problem.  MEDS:   Current Outpatient Medications:     glucose blood (ONETOUCH VERIO) strip, Use to check blood sugars twice daily or prn, Disp: 300 Strip, Rfl: 1    ondansetron (ZOFRAN ODT) 4 MG TABLET DISPERSIBLE, Take 4 mg by mouth every 6 hours as needed for Nausea., Disp: , Rfl:     traZODone (DESYREL) 100 MG Tab, Take 100 mg by mouth every evening., Disp: , Rfl:     metFORMIN (GLUCOPHAGE) 500 MG Tab, Take 1 tab po twice daily, Disp: 180 Tablet, Rfl: 0    levothyroxine (SYNTHROID) 75 MCG Tab, Take 1 Tablet by mouth every morning on an empty stomach., Disp: 90 Tablet, Rfl: 1    enalapril (VASOTEC) 20 MG tablet, Take 1 Tablet by mouth every day., Disp: 90 Tablet, Rfl: 1    mirtazapine (REMERON) 15 MG Tab, Take 1 Tablet by mouth every day., Disp: 90 Tablet, Rfl: 1     atorvastatin (LIPITOR) 40 MG Tab, Take 1 Tablet by mouth every day., Disp: 90 Tablet, Rfl: 1    Multiple Vitamins-Minerals (DAILY MULTIVITAMIN) Cap, Take 1 Capsule by mouth every day., Disp: , Rfl:     acetaminophen (TYLENOL) 650 MG CR tablet, Take 650 mg by mouth every 6 hours as needed. Indications: Pain, Disp: , Rfl:     calcium carbonate (OS-LESLEY 500) 1250 (500 Ca) MG Tab, Take 500 mg by mouth every day., Disp: , Rfl:     aspirin EC (ECOTRIN) 81 MG Tablet Delayed Response, Take 81 mg by mouth every day., Disp: , Rfl:   ALLERGIES:   Allergies   Allergen Reactions    Penicillins Rash     .     SURGHX:   Past Surgical History:   Procedure Laterality Date    IA TOTAL HIP ARTHROPLASTY Left 4/8/2021    Procedure: ARTHROPLASTY, HIP, TOTAL;  Surgeon: Sánchez Ortez M.D.;  Location: SURGERY [facility];  Service: Orthopedics    KNEE ARTHROPLASTY TOTAL Right 3/13/2017    Procedure: KNEE ARTHROPLASTY TOTAL;  Surgeon: Pop Cartwright M.D.;  Location: SURGERY [facility];  Service:     OTHER ORTHOPEDIC SURGERY Right 2004?    Right knee surgery     SOCHX:  reports that she has never smoked. She has never used smokeless tobacco. She reports that she does not drink alcohol and does not use drugs.  FH: Family history was reviewed, no pertinent findings to report      Review of Systems   Constitutional:  Negative for chills, fever and malaise/fatigue.   HENT:  Positive for congestion and ear pain. Negative for sore throat.    Eyes:  Negative for discharge and redness.   Respiratory:  Positive for cough, shortness of breath and wheezing.    Cardiovascular: Negative.    Gastrointestinal:  Negative for abdominal pain, constipation, diarrhea, nausea and vomiting.   Musculoskeletal:  Positive for myalgias. Negative for neck pain.   Skin:  Negative for itching and rash.   Neurological:  Negative for dizziness, weakness and headaches.   Endo/Heme/Allergies:  Negative for environmental allergies.   All other systems reviewed and are  "negative.           Objective     /74   Pulse 69   Temp 36.2 °C (97.1 °F)   Resp 20   Ht 1.549 m (5' 1\")   Wt 71.2 kg (157 lb)   SpO2 94%   BMI 29.66 kg/m²      Physical Exam  Vitals reviewed.   Constitutional:       General: She is awake. She is not in acute distress.     Appearance: Normal appearance. She is well-developed. She is not ill-appearing, toxic-appearing or diaphoretic.   HENT:      Head: Normocephalic.      Right Ear: Tympanic membrane, ear canal and external ear normal.      Left Ear: Tympanic membrane, ear canal and external ear normal.      Nose: Nose normal.      Mouth/Throat:      Lips: Pink.      Mouth: Mucous membranes are dry.      Pharynx: Oropharynx is clear. Uvula midline.   Eyes:      Conjunctiva/sclera: Conjunctivae normal.      Pupils: Pupils are equal, round, and reactive to light.   Cardiovascular:      Rate and Rhythm: Normal rate.   Pulmonary:      Breath sounds: Normal air entry. No stridor, decreased air movement or transmitted upper airway sounds. Examination of the left-lower field reveals rhonchi. Rhonchi present. No decreased breath sounds, wheezing or rales.   Musculoskeletal:         General: Normal range of motion.      Cervical back: Normal range of motion and neck supple.   Skin:     General: Skin is warm and dry.   Neurological:      Mental Status: She is alert and oriented to person, place, and time.   Psychiatric:         Attention and Perception: Attention normal.         Mood and Affect: Mood normal.         Speech: Speech normal.         Behavior: Behavior normal. Behavior is cooperative.                           Assessment & Plan        1. Shortness of breath    - POCT Influenza A/B  - POCT SARS-COV Antigen DARLYN (Symptomatic only)  - DX-CHEST-2 VIEWS; Future  - albuterol 108 (90 Base) MCG/ACT Aero Soln inhalation aerosol; Inhale 2 Puffs every 6 hours as needed for Shortness of Breath.  Dispense: 8.5 g; Refill: 0    2. Acute cough    - POCT Influenza " A/B  - POCT SARS-COV Antigen DARLYN (Symptomatic only)  - DX-CHEST-2 VIEWS; Future    3. Nasal congestion with rhinorrhea    - POCT Influenza A/B  - POCT SARS-COV Antigen DARLYN (Symptomatic only)    Discontinue over-the-counter multisymptom decongestants at this time due to age, history of hypertension and diabetes due to side effects  -Maintain hydration/water intake  -May use over the counter Ibuprofen/Tylenol as needed for fever or body aches  -May use humidifier/vaporizer at home as needed for dry cough/nasal passages  -Gargle salt water or throat lozenges as needed for throat irritation/dry cough  -Get rest  -May use daily longer acting antihistamine as needed (no decongestant) for any post nasal drainage   -May use saline nasal spray/Flonase as needed to flush any nasal congestion/post nasal drainage   -May use over-the-counter plain Mucinex as needed for expectorant  -Monitor for fevers, worse cough, phlegm, shortness of breath, wheeze, chest tightness, weakness or consistent elevated blood glucoses greater than 300 with lethargy- need re-evaluation immediately in urgent care

## (undated) DEVICE — SET EXTENSION WITH 2 PORTS (48EA/CA) ***PART #2C8610 IS A SUBSTITUTE*****

## (undated) DEVICE — ELECTRODE DUAL RETURN W/ CORD - (50/PK)

## (undated) DEVICE — DRAPE U ORTHOPEDIC - (10/BX)

## (undated) DEVICE — KIT EVACUATER 3 SPRING PVC LF 1/8 DRAIN SIZE (10EA/CA)"

## (undated) DEVICE — MASK ANESTHESIA ADULT  - (100/CA)

## (undated) DEVICE — SENSOR SPO2 NEO LNCS ADHESIVE (20/BX) SEE USER NOTES

## (undated) DEVICE — WATER IRRIG. STER. 1500 ML - (9/CA)

## (undated) DEVICE — CANISTER SUCTION 3000ML MECHANICAL FILTER AUTO SHUTOFF MEDI-VAC NONSTERILE LF DISP  (40EA/CA)

## (undated) DEVICE — Device

## (undated) DEVICE — DERMABOND ADVANCED - (12EA/BX)

## (undated) DEVICE — SODIUM CHL IRRIGATION 0.9% 1000ML (12EA/CA)

## (undated) DEVICE — SUTURE 1 VICRYL PLUS CT-1 - 18 INCH (12/BX)

## (undated) DEVICE — GLOVE BIOGEL PI ORTHO SZ 8.5 PF LF (40/BX)

## (undated) DEVICE — STAPLER SKIN DISP - (6/BX 10BX/CA) VISISTAT

## (undated) DEVICE — BAG, SPONGE COUNT 50600

## (undated) DEVICE — GLOVE BIOGEL ECLIPSE PF LATEX SIZE 8.5 (50PR/BX)

## (undated) DEVICE — NEEDLE SPINAL NON-SAFETY 18 GA X 3 IN (25EA/BX)

## (undated) DEVICE — SUCTION INSTRUMENT YANKAUER BULBOUS TIP W/O VENT (50EA/CA)

## (undated) DEVICE — GLOVE 7.0 LF PF PROTEXIS (50PR/BX)

## (undated) DEVICE — GLOVE BIOGEL PI INDICATOR SZ 6.5 SURGICAL PF LF - (50/BX 4BX/CA)

## (undated) DEVICE — KIT ROOM DECONTAMINATION

## (undated) DEVICE — HANDPIECE 10FT INTPLS SCT PLS IRRIGATION HAND CONTROL SET (6/PK)

## (undated) DEVICE — SUTURE GENERAL

## (undated) DEVICE — SUTURE 2-0 VICRYL PLUS CTX - 8 X 18 INCH (12/BX)

## (undated) DEVICE — SODIUM CHL. IRRIGATION 0.9% 3000ML (4EA/CA 65CA/PF)

## (undated) DEVICE — TIP INTPLS HFLO ML ORFC BTRY - (12/CS)  FOR SURGILAV

## (undated) DEVICE — PAD LAP STERILE 18 X 18 - (5/PK 40PK/CA)

## (undated) DEVICE — ELECTRODE 850 FOAM ADHESIVE - HYDROGEL RADIOTRNSPRNT (50/PK)

## (undated) DEVICE — PROTECTOR ULNA NERVE - (36PR/CA)

## (undated) DEVICE — DRESSING POST OP BORDER 4 X 10 (5EA/BX)

## (undated) DEVICE — LENS/HOOD FOR SPACESUIT - (32/PK) PEEL AWAY FACE

## (undated) DEVICE — SUTURE 3-0 MONOCRYL PLUS PS-1 - 27 INCH (36/BX)

## (undated) DEVICE — MASK AIRWAY SIZE 4 UNIQUE SILICON (10EA/BX)

## (undated) DEVICE — DRESSING PETROLEUM GAUZE 5 X 9" (50EA/BX 4BX/CA)"

## (undated) DEVICE — SET LEADWIRE 5 LEAD BEDSIDE DISPOSABLE ECG (1SET OF 5/EA)

## (undated) DEVICE — STOCKINET TUBULAR 6IN STERILE - 6 X 48YDS (25/CA)

## (undated) DEVICE — GOWN WARMING STANDARD FLEX - (30/CA)

## (undated) DEVICE — SUTURE 2-0 VICRYL PLUS CT-1 36 (36PK/BX)"

## (undated) DEVICE — SYRINGE DISP. 60 CC LL - (30/BX, 12BX/CA)**WHEN THESE ARE GONE ORDER #500206**

## (undated) DEVICE — AQUACEL AG SURGICAL

## (undated) DEVICE — TUBING CLEARLINK DUO-VENT - C-FLO (48EA/CA)

## (undated) DEVICE — PADDING CAST 6 IN STERILE - 6 X 4 YDS (24/CA)

## (undated) DEVICE — CHLORAPREP 26 ML APPLICATOR - ORANGE TINT(25/CA)

## (undated) DEVICE — GLOVE BIOGEL PI INDICATOR SZ 8.0 SURGICAL PF LF -(50/BX 4BX/CA)

## (undated) DEVICE — PACK TOTAL HIP - (1/CA)

## (undated) DEVICE — SPONGE GAUZE STER 4X4 8-PL - (2/PK 50PK/BX 12BX/CS)

## (undated) DEVICE — GLOVE BIOGEL M SZ 8 SURGICAL PF LTX - (50/BX 4BX/CA)

## (undated) DEVICE — BLOCK

## (undated) DEVICE — GLOVE SZ 6.5 BIOGEL PI MICRO - PF LF (50PR/BX)

## (undated) DEVICE — PACK TOTAL KNEE  (1/CA)

## (undated) DEVICE — KIT ANESTHESIA W/CIRCUIT & 3/LT BAG W/FILTER (20EA/CA)

## (undated) DEVICE — NEPTUNE 4 PORT MANIFOLD - (20/PK)

## (undated) DEVICE — LACTATED RINGERS INJ 1000 ML - (14EA/CA 60CA/PF)

## (undated) DEVICE — GLOVE SZ 7.5 LF PROTEXIS (50PR/BX)

## (undated) DEVICE — PIN TROCAR GEN 1/8X3 (4EA/BX)

## (undated) DEVICE — SUTURE 5 TI-CRON HOS-14 - (36/BX)

## (undated) DEVICE — BLADE SAGITTAL SAW DUAL CUT 75.0 X 25.0MM (1/EA)

## (undated) DEVICE — HEAD HOLDER JUNIOR/ADULT

## (undated) DEVICE — GLOVE, LITE (PAIR)

## (undated) DEVICE — SUTURE 1 VICRYL PLUS CTX - 8 X 18 INCH (12/BX)

## (undated) DEVICE — SOD. CHL. INJ. 0.9% 1000 ML - (14EA/CA 60CA/PF)

## (undated) DEVICE — SYS BN CMNT HI VAC KT MXR BWL - (MIX-E-VAC II)  (10EA/CA)